# Patient Record
Sex: FEMALE | Race: OTHER | HISPANIC OR LATINO | URBAN - METROPOLITAN AREA
[De-identification: names, ages, dates, MRNs, and addresses within clinical notes are randomized per-mention and may not be internally consistent; named-entity substitution may affect disease eponyms.]

---

## 2018-01-02 ENCOUNTER — EMERGENCY (EMERGENCY)
Facility: HOSPITAL | Age: 28
LOS: 1 days | Discharge: HOME | End: 2018-01-02
Admitting: INTERNAL MEDICINE
Payer: COMMERCIAL

## 2018-01-02 PROCEDURE — 99284 EMERGENCY DEPT VISIT MOD MDM: CPT

## 2018-01-03 PROCEDURE — 99285 EMERGENCY DEPT VISIT HI MDM: CPT | Mod: 25

## 2018-01-03 PROCEDURE — 96375 TX/PRO/DX INJ NEW DRUG ADDON: CPT

## 2018-01-03 PROCEDURE — 94640 AIRWAY INHALATION TREATMENT: CPT

## 2018-01-03 PROCEDURE — 96374 THER/PROPH/DIAG INJ IV PUSH: CPT

## 2018-05-06 ENCOUNTER — EMERGENCY (EMERGENCY)
Facility: HOSPITAL | Age: 28
LOS: 1 days | Discharge: HOME | End: 2018-05-06
Admitting: EMERGENCY MEDICINE
Payer: COMMERCIAL

## 2018-05-06 PROCEDURE — 99283 EMERGENCY DEPT VISIT LOW MDM: CPT

## 2019-01-04 ENCOUNTER — RESULT CHARGE (OUTPATIENT)
Age: 29
End: 2019-01-04

## 2019-01-04 ENCOUNTER — APPOINTMENT (OUTPATIENT)
Dept: FAMILY MEDICINE | Facility: HOSPITAL | Age: 29
End: 2019-01-04

## 2019-01-04 ENCOUNTER — OUTPATIENT (OUTPATIENT)
Dept: OUTPATIENT SERVICES | Facility: HOSPITAL | Age: 29
LOS: 1 days | End: 2019-01-04
Payer: COMMERCIAL

## 2019-01-04 VITALS
BODY MASS INDEX: 42.69 KG/M2 | SYSTOLIC BLOOD PRESSURE: 110 MMHG | TEMPERATURE: 98.2 F | HEIGHT: 62 IN | WEIGHT: 232 LBS | HEART RATE: 84 BPM | OXYGEN SATURATION: 99 % | DIASTOLIC BLOOD PRESSURE: 70 MMHG | RESPIRATION RATE: 16 BRPM

## 2019-01-04 DIAGNOSIS — Z34.90 ENCOUNTER FOR SUPERVISION OF NORMAL PREGNANCY, UNSPECIFIED, UNSPECIFIED TRIMESTER: ICD-10-CM

## 2019-01-04 PROCEDURE — 81002 URINALYSIS NONAUTO W/O SCOPE: CPT

## 2019-01-04 PROCEDURE — G0463: CPT

## 2019-01-04 NOTE — PAST MEDICAL HISTORY
[Total Preg ___] : G: [unfilled] [Live Births___] : P: [unfilled] [Full Term ___] : Full Term: [unfilled]  [Living ___] : Living: [unfilled]

## 2019-01-06 NOTE — ASSESSMENT
[FreeTextEntry1] : 28 yrs   F comes for positive pregnancy test .\par \par #Positive pregnancy test \par -Continue PNV\par -Pyridoxine 25 mg q 6 hrs PRN for the nausea \par -TVUS and TAB with full bladder after 2-3 weeks as she is 4.6 weeks based on LMP\par \par \par \par d/w with Dr Ocampo \par \par RTC in 4 weeks for follow up Initial prenatal

## 2019-01-06 NOTE — HISTORY OF PRESENT ILLNESS
[FreeTextEntry8] : 28 yrs   F 4.6 weeks  comes for positive pregnancy test .Nauseous + no vomiting ,forcing to eat appetite not great because of the nausea and feeling thirsty .Left upper Abdominal pain here and there not bothering her ,No vaginal discharge ,bleeding ,dysuria ,frequency . \par Uncomplicated previous pregnancy .\par Was taking Mucinex and psuedophed took for 2-3 days about 3 weeks ago .\par 1 pregnancy - - (INDUCED)-[facility]\par 2 Pregnancy - - (INDUCED )-[place]\par No travel outside [place] in the past 6 months  \par \par LMP- 2018 was light period

## 2019-01-07 DIAGNOSIS — O21.9 VOMITING OF PREGNANCY, UNSPECIFIED: ICD-10-CM

## 2019-01-07 DIAGNOSIS — Z32.01 ENCOUNTER FOR PREGNANCY TEST, RESULT POSITIVE: ICD-10-CM

## 2019-01-08 LAB
HCG UR QL: POSITIVE
QUALITY CONTROL: YES

## 2019-01-28 ENCOUNTER — FORM ENCOUNTER (OUTPATIENT)
Age: 29
End: 2019-01-28

## 2019-01-29 ENCOUNTER — OUTPATIENT (OUTPATIENT)
Dept: OUTPATIENT SERVICES | Facility: HOSPITAL | Age: 29
LOS: 1 days | End: 2019-01-29
Payer: COMMERCIAL

## 2019-01-29 DIAGNOSIS — Z32.01 ENCOUNTER FOR PREGNANCY TEST, RESULT POSITIVE: ICD-10-CM

## 2019-01-29 PROCEDURE — 76801 OB US < 14 WKS SINGLE FETUS: CPT

## 2019-01-29 PROCEDURE — 76817 TRANSVAGINAL US OBSTETRIC: CPT

## 2019-01-29 PROCEDURE — 76817 TRANSVAGINAL US OBSTETRIC: CPT | Mod: 26

## 2019-01-29 PROCEDURE — 76801 OB US < 14 WKS SINGLE FETUS: CPT | Mod: 26

## 2019-02-08 ENCOUNTER — APPOINTMENT (OUTPATIENT)
Dept: FAMILY MEDICINE | Facility: HOSPITAL | Age: 29
End: 2019-02-08

## 2019-02-08 ENCOUNTER — OUTPATIENT (OUTPATIENT)
Dept: OUTPATIENT SERVICES | Facility: HOSPITAL | Age: 29
LOS: 1 days | End: 2019-02-08
Payer: MEDICAID

## 2019-02-08 VITALS
TEMPERATURE: 97.8 F | SYSTOLIC BLOOD PRESSURE: 94 MMHG | BODY MASS INDEX: 42.51 KG/M2 | HEIGHT: 62 IN | RESPIRATION RATE: 16 BRPM | DIASTOLIC BLOOD PRESSURE: 67 MMHG | HEART RATE: 96 BPM | WEIGHT: 231 LBS | OXYGEN SATURATION: 99 %

## 2019-02-08 DIAGNOSIS — Z00.00 ENCOUNTER FOR GENERAL ADULT MEDICAL EXAMINATION WITHOUT ABNORMAL FINDINGS: ICD-10-CM

## 2019-02-08 PROCEDURE — 81002 URINALYSIS NONAUTO W/O SCOPE: CPT

## 2019-02-08 PROCEDURE — G0463: CPT

## 2019-02-08 RX ORDER — LORATADINE 10 MG
28-0.8 TABLET ORAL
Qty: 60 | Refills: 1 | Status: ACTIVE | COMMUNITY
Start: 2019-02-08

## 2019-02-08 RX ORDER — PYRIDOXINE HCL (VITAMIN B6) 25 MG
25 TABLET ORAL EVERY 6 HOURS
Qty: 30 | Refills: 0 | Status: DISCONTINUED | COMMUNITY
Start: 2019-01-04 | End: 2019-02-08

## 2019-02-08 NOTE — PHYSICAL EXAM
[No Acute Distress] : no acute distress [Well Nourished] : well nourished [No JVD] : no jugular venous distention [No Respiratory Distress] : no respiratory distress  [Clear to Auscultation] : lungs were clear to auscultation bilaterally [No Accessory Muscle Use] : no accessory muscle use [Normal Rate] : normal rate  [Regular Rhythm] : with a regular rhythm [Normal S1, S2] : normal S1 and S2 [No Murmur] : no murmur heard [Pedal Pulses Present] : the pedal pulses are present [No Edema] : there was no peripheral edema [Soft] : abdomen soft [Non Tender] : non-tender [No HSM] : no HSM [Normal Bowel Sounds] : normal bowel sounds [Normal Gait] : normal gait [Normal Affect] : the affect was normal [Normal Insight/Judgement] : insight and judgment were intact

## 2019-02-10 NOTE — HISTORY OF PRESENT ILLNESS
[FreeTextEntry8] : 29 y/o F,  (s/p  x2, both required induction) at 10 weeks+4 days (by US, EDC by US 2019) reports severe headaches. Not responsive to tylenol treatment. \par Has had significant nausea and vomiting for the past several days. 2 episodes vomiting today. Reports she has not been eating significantly but is able to keep down fluids. \par Tried pyridoxine but reports this made her more nauseated. \par \par Taking prenatal vitamins.\par No vaginal bleeding, discharge or fluid leakage.

## 2019-02-10 NOTE — ASSESSMENT
[FreeTextEntry1] : #Nausea and vomiting in pregnancy\par -Trial of zofran\par -Drink plenty of fluids\par -Take tylenol as needed for headaches.\par -RTC 1-2 weeks for follow-up/ initial prenatal. \par -Scheduled ultrascreen. Fingerstick sent. \par

## 2019-02-11 DIAGNOSIS — Z34.90 ENCOUNTER FOR SUPERVISION OF NORMAL PREGNANCY, UNSPECIFIED, UNSPECIFIED TRIMESTER: ICD-10-CM

## 2019-02-11 DIAGNOSIS — O21.9 VOMITING OF PREGNANCY, UNSPECIFIED: ICD-10-CM

## 2019-02-19 ENCOUNTER — APPOINTMENT (OUTPATIENT)
Age: 29
End: 2019-02-19

## 2019-02-28 ENCOUNTER — ASOB RESULT (OUTPATIENT)
Age: 29
End: 2019-02-28

## 2019-02-28 ENCOUNTER — APPOINTMENT (OUTPATIENT)
Dept: ANTEPARTUM | Facility: CLINIC | Age: 29
End: 2019-02-28
Payer: MEDICAID

## 2019-02-28 PROCEDURE — 76801 OB US < 14 WKS SINGLE FETUS: CPT

## 2019-02-28 PROCEDURE — 36416 COLLJ CAPILLARY BLOOD SPEC: CPT

## 2019-02-28 PROCEDURE — 76813 OB US NUCHAL MEAS 1 GEST: CPT

## 2019-03-01 ENCOUNTER — MED ADMIN CHARGE (OUTPATIENT)
Age: 29
End: 2019-03-01

## 2019-03-01 ENCOUNTER — APPOINTMENT (OUTPATIENT)
Age: 29
End: 2019-03-01

## 2019-03-01 ENCOUNTER — OUTPATIENT (OUTPATIENT)
Dept: OUTPATIENT SERVICES | Facility: HOSPITAL | Age: 29
LOS: 1 days | End: 2019-03-01
Payer: MEDICAID

## 2019-03-01 ENCOUNTER — NON-APPOINTMENT (OUTPATIENT)
Age: 29
End: 2019-03-01

## 2019-03-01 VITALS
SYSTOLIC BLOOD PRESSURE: 105 MMHG | OXYGEN SATURATION: 99 % | WEIGHT: 235 LBS | DIASTOLIC BLOOD PRESSURE: 72 MMHG | BODY MASS INDEX: 43.24 KG/M2 | TEMPERATURE: 98.1 F | RESPIRATION RATE: 16 BRPM | HEART RATE: 92 BPM | HEIGHT: 62 IN

## 2019-03-01 DIAGNOSIS — Z34.80 ENCOUNTER FOR SUPERVISION OF OTHER NORMAL PREGNANCY, UNSPECIFIED TRIMESTER: ICD-10-CM

## 2019-03-01 PROCEDURE — 81329 SMN1 GENE DOS/DELETION ALYS: CPT

## 2019-03-01 PROCEDURE — 81220 CFTR GENE COM VARIANTS: CPT

## 2019-03-01 PROCEDURE — 87086 URINE CULTURE/COLONY COUNT: CPT

## 2019-03-01 PROCEDURE — 87389 HIV-1 AG W/HIV-1&-2 AB AG IA: CPT

## 2019-03-01 PROCEDURE — 83655 ASSAY OF LEAD: CPT

## 2019-03-01 PROCEDURE — 80307 DRUG TEST PRSMV CHEM ANLYZR: CPT

## 2019-03-01 PROCEDURE — 86780 TREPONEMA PALLIDUM: CPT

## 2019-03-01 PROCEDURE — 81002 URINALYSIS NONAUTO W/O SCOPE: CPT

## 2019-03-01 PROCEDURE — G0463: CPT

## 2019-03-01 PROCEDURE — 83020 HEMOGLOBIN ELECTROPHORESIS: CPT

## 2019-03-01 PROCEDURE — 86762 RUBELLA ANTIBODY: CPT

## 2019-03-01 PROCEDURE — 86850 RBC ANTIBODY SCREEN: CPT

## 2019-03-01 PROCEDURE — 87800 DETECT AGNT MULT DNA DIREC: CPT

## 2019-03-01 PROCEDURE — 87340 HEPATITIS B SURFACE AG IA: CPT

## 2019-03-01 PROCEDURE — 81001 URINALYSIS AUTO W/SCOPE: CPT

## 2019-03-01 PROCEDURE — 86900 BLOOD TYPING SEROLOGIC ABO: CPT

## 2019-03-03 LAB
APPEARANCE: ABNORMAL
BACTERIA UR CULT: NORMAL
BACTERIA: ABNORMAL
BILIRUBIN URINE: NEGATIVE
BLOOD URINE: NEGATIVE
C TRACH RRNA SPEC QL NAA+PROBE: NOT DETECTED
CALCIUM OXALATE CRYSTALS: ABNORMAL
CANDIDA VAG CYTO: NOT DETECTED
COLOR: YELLOW
G VAGINALIS+PREV SP MTYP VAG QL MICRO: DETECTED
GLUCOSE QUALITATIVE U: NEGATIVE
HBV SURFACE AG SER QL: NONREACTIVE
HIV1+2 AB SPEC QL IA.RAPID: NONREACTIVE
HYALINE CASTS: 5 /LPF
KETONES URINE: NEGATIVE
LEAD BLD-MCNC: 1 UG/DL
LEUKOCYTE ESTERASE URINE: NEGATIVE
MICROSCOPIC-UA: NORMAL
N GONORRHOEA RRNA SPEC QL NAA+PROBE: NOT DETECTED
NITRITE URINE: NEGATIVE
PH URINE: 6.5
PROTEIN URINE: ABNORMAL
RED BLOOD CELLS URINE: 3 /HPF
SOURCE AMPLIFICATION: NORMAL
SPECIFIC GRAVITY URINE: 1.03
SQUAMOUS EPITHELIAL CELLS: >27 /HPF
T PALLIDUM AB SER QL IA: NEGATIVE
T VAGINALIS VAG QL WET PREP: NOT DETECTED
URINE COMMENTS: NORMAL
UROBILINOGEN URINE: NORMAL
WHITE BLOOD CELLS URINE: 23 /HPF

## 2019-03-04 ENCOUNTER — NON-APPOINTMENT (OUTPATIENT)
Age: 29
End: 2019-03-04

## 2019-03-04 LAB
ABO + RH PNL BLD: NORMAL
BLD GP AB SCN SERPL QL: NORMAL
HGB A MFR BLD: 96.8 %
HGB A2 MFR BLD: 2.8 %
HGB F MFR BLD: 0.4 %
HGB FRACT BLD-IMP: NORMAL
RUBV IGG FLD-ACNC: 1 INDEX
RUBV IGG SER-IMP: NORMAL

## 2019-03-07 LAB
AMPHET UR-MCNC: NEGATIVE
BARBITURATES UR-MCNC: NEGATIVE
BENZODIAZ UR-MCNC: NEGATIVE
COCAINE METAB.OTHER UR-MCNC: NEGATIVE
CREATININE, URINE: 152.9 MG/DL
CYTOLOGY CVX/VAG DOC THIN PREP: NORMAL
METHADONE UR-MCNC: NEGATIVE
METHAQUALONE UR-MCNC: NEGATIVE
OPIATES UR-MCNC: NEGATIVE
PCP UR-MCNC: NEGATIVE
PROPOXYPH UR QL: NEGATIVE
THC UR QL: NEGATIVE

## 2019-03-12 LAB
AR GENE MUT ANL BLD/T: NEGATIVE
CFTR MUT TESTED BLD/T: NEGATIVE

## 2019-03-15 ENCOUNTER — OUTPATIENT (OUTPATIENT)
Dept: OUTPATIENT SERVICES | Facility: HOSPITAL | Age: 29
LOS: 1 days | End: 2019-03-15
Payer: MEDICAID

## 2019-03-15 ENCOUNTER — NON-APPOINTMENT (OUTPATIENT)
Age: 29
End: 2019-03-15

## 2019-03-15 ENCOUNTER — APPOINTMENT (OUTPATIENT)
Age: 29
End: 2019-03-15

## 2019-03-15 VITALS
RESPIRATION RATE: 16 BRPM | SYSTOLIC BLOOD PRESSURE: 122 MMHG | TEMPERATURE: 97.6 F | OXYGEN SATURATION: 99 % | HEART RATE: 95 BPM | HEIGHT: 62 IN | BODY MASS INDEX: 43.43 KG/M2 | WEIGHT: 236 LBS | DIASTOLIC BLOOD PRESSURE: 77 MMHG

## 2019-03-15 DIAGNOSIS — Z00.00 ENCOUNTER FOR GENERAL ADULT MEDICAL EXAMINATION WITHOUT ABNORMAL FINDINGS: ICD-10-CM

## 2019-03-15 PROCEDURE — G0463: CPT

## 2019-03-15 PROCEDURE — 81002 URINALYSIS NONAUTO W/O SCOPE: CPT

## 2019-03-19 DIAGNOSIS — Z34.90 ENCOUNTER FOR SUPERVISION OF NORMAL PREGNANCY, UNSPECIFIED, UNSPECIFIED TRIMESTER: ICD-10-CM

## 2019-03-28 ENCOUNTER — NON-APPOINTMENT (OUTPATIENT)
Age: 29
End: 2019-03-28

## 2019-03-28 ENCOUNTER — OUTPATIENT (OUTPATIENT)
Dept: OUTPATIENT SERVICES | Facility: HOSPITAL | Age: 29
LOS: 1 days | End: 2019-03-28
Payer: MEDICAID

## 2019-03-28 ENCOUNTER — APPOINTMENT (OUTPATIENT)
Age: 29
End: 2019-03-28

## 2019-03-28 DIAGNOSIS — Z34.90 ENCOUNTER FOR SUPERVISION OF NORMAL PREGNANCY, UNSPECIFIED, UNSPECIFIED TRIMESTER: ICD-10-CM

## 2019-03-28 PROCEDURE — 81002 URINALYSIS NONAUTO W/O SCOPE: CPT

## 2019-03-28 PROCEDURE — 86336 INHIBIN A: CPT

## 2019-03-28 PROCEDURE — G0463: CPT

## 2019-03-28 RX ORDER — METRONIDAZOLE 500 MG/1
500 TABLET ORAL TWICE DAILY
Qty: 14 | Refills: 0 | Status: COMPLETED | COMMUNITY
Start: 2019-03-03 | End: 2019-03-14

## 2019-03-28 RX ORDER — ONDANSETRON 4 MG/1
4 TABLET ORAL EVERY 8 HOURS
Qty: 1 | Refills: 0 | Status: COMPLETED | COMMUNITY
Start: 2019-02-08 | End: 2019-03-28

## 2019-04-17 ENCOUNTER — ASOB RESULT (OUTPATIENT)
Age: 29
End: 2019-04-17

## 2019-04-17 ENCOUNTER — APPOINTMENT (OUTPATIENT)
Dept: ANTEPARTUM | Facility: CLINIC | Age: 29
End: 2019-04-17
Payer: MEDICAID

## 2019-04-17 PROCEDURE — 76811 OB US DETAILED SNGL FETUS: CPT

## 2019-04-17 PROCEDURE — 76817 TRANSVAGINAL US OBSTETRIC: CPT

## 2019-04-25 ENCOUNTER — NON-APPOINTMENT (OUTPATIENT)
Age: 29
End: 2019-04-25

## 2019-04-25 ENCOUNTER — OUTPATIENT (OUTPATIENT)
Dept: OUTPATIENT SERVICES | Facility: HOSPITAL | Age: 29
LOS: 1 days | End: 2019-04-25
Payer: MEDICAID

## 2019-04-25 ENCOUNTER — APPOINTMENT (OUTPATIENT)
Age: 29
End: 2019-04-25

## 2019-04-25 VITALS
SYSTOLIC BLOOD PRESSURE: 118 MMHG | OXYGEN SATURATION: 100 % | HEART RATE: 86 BPM | WEIGHT: 239 LBS | DIASTOLIC BLOOD PRESSURE: 74 MMHG | BODY MASS INDEX: 43.71 KG/M2 | TEMPERATURE: 97.9 F | RESPIRATION RATE: 16 BRPM

## 2019-04-25 DIAGNOSIS — Z34.80 ENCOUNTER FOR SUPERVISION OF OTHER NORMAL PREGNANCY, UNSPECIFIED TRIMESTER: ICD-10-CM

## 2019-04-25 PROCEDURE — 81002 URINALYSIS NONAUTO W/O SCOPE: CPT

## 2019-04-25 PROCEDURE — G0463: CPT

## 2019-04-30 DIAGNOSIS — Z34.90 ENCOUNTER FOR SUPERVISION OF NORMAL PREGNANCY, UNSPECIFIED, UNSPECIFIED TRIMESTER: ICD-10-CM

## 2019-05-16 ENCOUNTER — OUTPATIENT (OUTPATIENT)
Dept: OUTPATIENT SERVICES | Facility: HOSPITAL | Age: 29
LOS: 1 days | End: 2019-05-16
Payer: MEDICAID

## 2019-05-16 ENCOUNTER — APPOINTMENT (OUTPATIENT)
Age: 29
End: 2019-05-16

## 2019-05-16 ENCOUNTER — NON-APPOINTMENT (OUTPATIENT)
Age: 29
End: 2019-05-16

## 2019-05-16 VITALS
DIASTOLIC BLOOD PRESSURE: 78 MMHG | RESPIRATION RATE: 16 BRPM | OXYGEN SATURATION: 98 % | BODY MASS INDEX: 44.81 KG/M2 | SYSTOLIC BLOOD PRESSURE: 131 MMHG | WEIGHT: 245 LBS | HEART RATE: 96 BPM | TEMPERATURE: 98.2 F

## 2019-05-16 VITALS — SYSTOLIC BLOOD PRESSURE: 139 MMHG | DIASTOLIC BLOOD PRESSURE: 81 MMHG

## 2019-05-16 DIAGNOSIS — Z00.00 ENCOUNTER FOR GENERAL ADULT MEDICAL EXAMINATION WITHOUT ABNORMAL FINDINGS: ICD-10-CM

## 2019-05-16 PROCEDURE — G0463: CPT

## 2019-05-16 PROCEDURE — 81002 URINALYSIS NONAUTO W/O SCOPE: CPT

## 2019-05-17 ENCOUNTER — NON-APPOINTMENT (OUTPATIENT)
Age: 29
End: 2019-05-17

## 2019-05-17 DIAGNOSIS — Z34.90 ENCOUNTER FOR SUPERVISION OF NORMAL PREGNANCY, UNSPECIFIED, UNSPECIFIED TRIMESTER: ICD-10-CM

## 2019-05-20 ENCOUNTER — OUTPATIENT (OUTPATIENT)
Dept: OUTPATIENT SERVICES | Facility: HOSPITAL | Age: 29
LOS: 1 days | End: 2019-05-20
Payer: MEDICAID

## 2019-05-20 ENCOUNTER — APPOINTMENT (OUTPATIENT)
Dept: FAMILY MEDICINE | Facility: HOSPITAL | Age: 29
End: 2019-05-20

## 2019-05-20 VITALS
WEIGHT: 243 LBS | OXYGEN SATURATION: 99 % | HEART RATE: 97 BPM | BODY MASS INDEX: 44.45 KG/M2 | RESPIRATION RATE: 16 BRPM | SYSTOLIC BLOOD PRESSURE: 114 MMHG | TEMPERATURE: 98.3 F | DIASTOLIC BLOOD PRESSURE: 75 MMHG

## 2019-05-20 DIAGNOSIS — O21.9 VOMITING OF PREGNANCY, UNSPECIFIED: ICD-10-CM

## 2019-05-20 DIAGNOSIS — Z32.01 ENCOUNTER FOR PREGNANCY TEST, RESULT POSITIVE: ICD-10-CM

## 2019-05-20 DIAGNOSIS — B96.89 ACUTE VAGINITIS: ICD-10-CM

## 2019-05-20 DIAGNOSIS — N76.0 ACUTE VAGINITIS: ICD-10-CM

## 2019-05-20 DIAGNOSIS — Z00.00 ENCOUNTER FOR GENERAL ADULT MEDICAL EXAMINATION WITHOUT ABNORMAL FINDINGS: ICD-10-CM

## 2019-05-20 PROCEDURE — G0463: CPT

## 2019-05-20 PROCEDURE — 81002 URINALYSIS NONAUTO W/O SCOPE: CPT

## 2019-05-20 NOTE — PHYSICAL EXAM
[No Acute Distress] : no acute distress [Well Nourished] : well nourished [Well Developed] : well developed [Well-Appearing] : well-appearing [PERRL] : pupils equal round and reactive to light [Normal Sclera/Conjunctiva] : normal sclera/conjunctiva [EOMI] : extraocular movements intact [Normal Oropharynx] : the oropharynx was normal [Normal Outer Ear/Nose] : the outer ears and nose were normal in appearance [No JVD] : no jugular venous distention [Thyroid Normal, No Nodules] : the thyroid was normal and there were no nodules present [No Lymphadenopathy] : no lymphadenopathy [Supple] : supple [No Respiratory Distress] : no respiratory distress  [Clear to Auscultation] : lungs were clear to auscultation bilaterally [No Accessory Muscle Use] : no accessory muscle use [Normal Rate] : normal rate  [Normal S1, S2] : normal S1 and S2 [Regular Rhythm] : with a regular rhythm [No Murmur] : no murmur heard [No Carotid Bruits] : no carotid bruits [No Abdominal Bruit] : a ~M bruit was not heard ~T in the abdomen [No Varicosities] : no varicosities [No Edema] : there was no peripheral edema [Pedal Pulses Present] : the pedal pulses are present [Soft] : abdomen soft [No Extremity Clubbing/Cyanosis] : no extremity clubbing/cyanosis [No Palpable Aorta] : no palpable aorta [Non-distended] : non-distended [Non Tender] : non-tender [No Masses] : no abdominal mass palpated [No HSM] : no HSM [Normal Posterior Cervical Nodes] : no posterior cervical lymphadenopathy [Normal Bowel Sounds] : normal bowel sounds [No CVA Tenderness] : no CVA  tenderness [Normal Anterior Cervical Nodes] : no anterior cervical lymphadenopathy [No Joint Swelling] : no joint swelling [No Spinal Tenderness] : no spinal tenderness [No Rash] : no rash [Grossly Normal Strength/Tone] : grossly normal strength/tone [Normal Gait] : normal gait [Coordination Grossly Intact] : coordination grossly intact [No Focal Deficits] : no focal deficits [Deep Tendon Reflexes (DTR)] : deep tendon reflexes were 2+ and symmetric [Normal Affect] : the affect was normal [Normal Insight/Judgement] : insight and judgment were intact [de-identified] : morbidly obese

## 2019-05-20 NOTE — ASSESSMENT
[FreeTextEntry1] : #Elevated BP\par - RESOLVED\par - 114/75 today\par - Stay well hydrated\par - Cont PNV\par \par RTC on 5/31 9AM for PN follow up

## 2019-05-20 NOTE — HISTORY OF PRESENT ILLNESS
[FreeTextEntry8] : 27 YO  @25w GA presenting for BP check.  Was elevated at 139/81 on last visit. today normal. Patient feels tired sometimes.  No abdominal pain, baby moving, no blood or vaginal discharge. Staying well hydrated, taking PNV.

## 2019-05-21 DIAGNOSIS — Z34.90 ENCOUNTER FOR SUPERVISION OF NORMAL PREGNANCY, UNSPECIFIED, UNSPECIFIED TRIMESTER: ICD-10-CM

## 2019-05-31 ENCOUNTER — APPOINTMENT (OUTPATIENT)
Dept: FAMILY MEDICINE | Facility: HOSPITAL | Age: 29
End: 2019-05-31

## 2019-05-31 ENCOUNTER — NON-APPOINTMENT (OUTPATIENT)
Age: 29
End: 2019-05-31

## 2019-05-31 ENCOUNTER — OUTPATIENT (OUTPATIENT)
Dept: OUTPATIENT SERVICES | Facility: HOSPITAL | Age: 29
LOS: 1 days | End: 2019-05-31
Payer: MEDICAID

## 2019-05-31 VITALS
HEIGHT: 62 IN | OXYGEN SATURATION: 99 % | BODY MASS INDEX: 45.08 KG/M2 | DIASTOLIC BLOOD PRESSURE: 73 MMHG | WEIGHT: 245 LBS | TEMPERATURE: 97.8 F | SYSTOLIC BLOOD PRESSURE: 104 MMHG | RESPIRATION RATE: 16 BRPM | HEART RATE: 96 BPM

## 2019-05-31 DIAGNOSIS — Z34.01 ENCOUNTER FOR SUPERVISION OF NORMAL FIRST PREGNANCY, FIRST TRIMESTER: ICD-10-CM

## 2019-05-31 PROCEDURE — 82950 GLUCOSE TEST: CPT

## 2019-05-31 PROCEDURE — G0463: CPT

## 2019-05-31 PROCEDURE — 87389 HIV-1 AG W/HIV-1&-2 AB AG IA: CPT

## 2019-05-31 PROCEDURE — 86765 RUBEOLA ANTIBODY: CPT

## 2019-05-31 PROCEDURE — 81002 URINALYSIS NONAUTO W/O SCOPE: CPT

## 2019-06-04 DIAGNOSIS — Z34.90 ENCOUNTER FOR SUPERVISION OF NORMAL PREGNANCY, UNSPECIFIED, UNSPECIFIED TRIMESTER: ICD-10-CM

## 2019-06-14 ENCOUNTER — OUTPATIENT (OUTPATIENT)
Dept: OUTPATIENT SERVICES | Facility: HOSPITAL | Age: 29
LOS: 1 days | End: 2019-06-14
Payer: MEDICAID

## 2019-06-14 ENCOUNTER — APPOINTMENT (OUTPATIENT)
Dept: FAMILY MEDICINE | Facility: HOSPITAL | Age: 29
End: 2019-06-14

## 2019-06-14 ENCOUNTER — NON-APPOINTMENT (OUTPATIENT)
Age: 29
End: 2019-06-14

## 2019-06-14 VITALS
RESPIRATION RATE: 16 BRPM | SYSTOLIC BLOOD PRESSURE: 98 MMHG | HEART RATE: 97 BPM | OXYGEN SATURATION: 99 % | HEIGHT: 62 IN | BODY MASS INDEX: 45.45 KG/M2 | WEIGHT: 247 LBS | DIASTOLIC BLOOD PRESSURE: 64 MMHG | TEMPERATURE: 97.7 F

## 2019-06-14 DIAGNOSIS — Z00.00 ENCOUNTER FOR GENERAL ADULT MEDICAL EXAMINATION WITHOUT ABNORMAL FINDINGS: ICD-10-CM

## 2019-06-14 LAB — BLD GP AB SCN SERPL QL: SIGNIFICANT CHANGE UP

## 2019-06-14 PROCEDURE — 86850 RBC ANTIBODY SCREEN: CPT

## 2019-06-14 PROCEDURE — 86900 BLOOD TYPING SEROLOGIC ABO: CPT

## 2019-06-14 PROCEDURE — 81002 URINALYSIS NONAUTO W/O SCOPE: CPT

## 2019-06-14 PROCEDURE — 86901 BLOOD TYPING SEROLOGIC RH(D): CPT

## 2019-06-14 PROCEDURE — G0463: CPT

## 2019-06-14 PROCEDURE — 82731 ASSAY OF FETAL FIBRONECTIN: CPT

## 2019-06-14 RX ORDER — HUMAN RHO(D) IMMUNE GLOBULIN 300 UG/1
1500 INJECTION, SOLUTION INTRAMUSCULAR
Qty: 1 | Refills: 0 | Status: DISCONTINUED | COMMUNITY
Start: 2019-06-14 | End: 2019-06-14

## 2019-06-14 RX ADMIN — HUMAN RHO(D) IMMUNE GLOBULIN 0 UNIT: 300 INJECTION, SOLUTION INTRAMUSCULAR at 00:00

## 2019-06-17 DIAGNOSIS — Z34.90 ENCOUNTER FOR SUPERVISION OF NORMAL PREGNANCY, UNSPECIFIED, UNSPECIFIED TRIMESTER: ICD-10-CM

## 2019-06-18 ENCOUNTER — NON-APPOINTMENT (OUTPATIENT)
Age: 29
End: 2019-06-18

## 2019-06-18 DIAGNOSIS — O26.892 OTHER SPECIFIED PREGNANCY RELATED CONDITIONS, SECOND TRIMESTER: ICD-10-CM

## 2019-06-18 DIAGNOSIS — Z67.91 OTHER SPECIFIED PREGNANCY RELATED CONDITIONS, SECOND TRIMESTER: ICD-10-CM

## 2019-06-20 ENCOUNTER — APPOINTMENT (OUTPATIENT)
Dept: FAMILY MEDICINE | Facility: HOSPITAL | Age: 29
End: 2019-06-20

## 2019-06-20 ENCOUNTER — OUTPATIENT (OUTPATIENT)
Dept: OUTPATIENT SERVICES | Facility: HOSPITAL | Age: 29
LOS: 1 days | End: 2019-06-20
Payer: MEDICAID

## 2019-06-20 DIAGNOSIS — Z34.90 ENCOUNTER FOR SUPERVISION OF NORMAL PREGNANCY, UNSPECIFIED, UNSPECIFIED TRIMESTER: ICD-10-CM

## 2019-06-20 LAB
BLD GP AB SCN SERPL QL: SIGNIFICANT CHANGE UP
GLUCOSE 1H P GLC SERPL-MCNC: 205 MG/DL — HIGH (ref 70–199)
GLUCOSE 2H P GLC SERPL-MCNC: 150 MG/DL — SIGNIFICANT CHANGE UP
GLUCOSE 3H P GLC SERPL-MCNC: 56 MG/DL — SIGNIFICANT CHANGE UP
GLUCOSE P FAST BLDV-MCNC: 104 MG/DL — HIGH (ref 70–99)

## 2019-06-20 PROCEDURE — 86901 BLOOD TYPING SEROLOGIC RH(D): CPT

## 2019-06-20 PROCEDURE — 82951 GLUCOSE TOLERANCE TEST (GTT): CPT

## 2019-06-20 PROCEDURE — 86850 RBC ANTIBODY SCREEN: CPT

## 2019-06-20 PROCEDURE — 86900 BLOOD TYPING SEROLOGIC ABO: CPT

## 2019-06-26 DIAGNOSIS — O24.419 GESTATIONAL DIABETES MELLITUS IN PREGNANCY, UNSPECIFIED CONTROL: ICD-10-CM

## 2019-06-26 DIAGNOSIS — Z86.32 PERSONAL HISTORY OF GESTATIONAL DIABETES: ICD-10-CM

## 2019-06-28 ENCOUNTER — MESSAGE (OUTPATIENT)
Age: 29
End: 2019-06-28

## 2019-06-28 ENCOUNTER — APPOINTMENT (OUTPATIENT)
Dept: FAMILY MEDICINE | Facility: HOSPITAL | Age: 29
End: 2019-06-28

## 2019-06-28 ENCOUNTER — OUTPATIENT (OUTPATIENT)
Dept: OUTPATIENT SERVICES | Facility: HOSPITAL | Age: 29
LOS: 1 days | End: 2019-06-28
Payer: MEDICAID

## 2019-06-28 VITALS
SYSTOLIC BLOOD PRESSURE: 90 MMHG | OXYGEN SATURATION: 98 % | RESPIRATION RATE: 16 BRPM | TEMPERATURE: 97.6 F | HEART RATE: 85 BPM | DIASTOLIC BLOOD PRESSURE: 61 MMHG | BODY MASS INDEX: 46.19 KG/M2 | HEIGHT: 62 IN | WEIGHT: 251 LBS

## 2019-06-28 DIAGNOSIS — Z00.00 ENCOUNTER FOR GENERAL ADULT MEDICAL EXAMINATION WITHOUT ABNORMAL FINDINGS: ICD-10-CM

## 2019-06-28 PROCEDURE — 81002 URINALYSIS NONAUTO W/O SCOPE: CPT

## 2019-06-28 PROCEDURE — G0463: CPT

## 2019-06-29 ENCOUNTER — NON-APPOINTMENT (OUTPATIENT)
Age: 29
End: 2019-06-29

## 2019-07-02 ENCOUNTER — APPOINTMENT (OUTPATIENT)
Dept: MATERNAL FETAL MEDICINE | Facility: CLINIC | Age: 29
End: 2019-07-02
Payer: MEDICAID

## 2019-07-02 ENCOUNTER — ASOB RESULT (OUTPATIENT)
Age: 29
End: 2019-07-02

## 2019-07-02 DIAGNOSIS — Z34.90 ENCOUNTER FOR SUPERVISION OF NORMAL PREGNANCY, UNSPECIFIED, UNSPECIFIED TRIMESTER: ICD-10-CM

## 2019-07-02 PROCEDURE — G0109 DIAB MANAGE TRN IND/GROUP: CPT

## 2019-07-03 RX ORDER — URINE ACETONE TEST STRIPS
STRIP MISCELLANEOUS
Qty: 1 | Refills: 1 | Status: ACTIVE | COMMUNITY
Start: 2019-07-02 | End: 1900-01-01

## 2019-07-03 RX ORDER — LANCETS 33 GAUGE
EACH MISCELLANEOUS
Qty: 1 | Refills: 2 | Status: ACTIVE | COMMUNITY
Start: 2019-07-02 | End: 1900-01-01

## 2019-07-03 RX ORDER — BLOOD-GLUCOSE METER
KIT MISCELLANEOUS
Qty: 1 | Refills: 5 | Status: ACTIVE | COMMUNITY
Start: 2019-07-02 | End: 1900-01-01

## 2019-07-04 ENCOUNTER — NON-APPOINTMENT (OUTPATIENT)
Age: 29
End: 2019-07-04

## 2019-07-16 ENCOUNTER — APPOINTMENT (OUTPATIENT)
Dept: MATERNAL FETAL MEDICINE | Facility: CLINIC | Age: 29
End: 2019-07-16
Payer: MEDICAID

## 2019-07-16 LAB
GLUCOSE 1H P 100 G GLC PO SERPL-MCNC: 163 MG/DL
HIV1+2 AB SPEC QL IA.RAPID: NONREACTIVE
MEV IGG FLD QL IA: 33.7 AU/ML
MEV IGG+IGM SER-IMP: POSITIVE

## 2019-07-16 PROCEDURE — G0109 DIAB MANAGE TRN IND/GROUP: CPT

## 2019-07-17 ENCOUNTER — OUTPATIENT (OUTPATIENT)
Dept: OUTPATIENT SERVICES | Facility: HOSPITAL | Age: 29
LOS: 1 days | End: 2019-07-17
Payer: MEDICAID

## 2019-07-17 ENCOUNTER — APPOINTMENT (OUTPATIENT)
Dept: FAMILY MEDICINE | Facility: HOSPITAL | Age: 29
End: 2019-07-17

## 2019-07-17 ENCOUNTER — APPOINTMENT (OUTPATIENT)
Dept: MATERNAL FETAL MEDICINE | Facility: CLINIC | Age: 29
End: 2019-07-17

## 2019-07-17 ENCOUNTER — NON-APPOINTMENT (OUTPATIENT)
Age: 29
End: 2019-07-17

## 2019-07-17 VITALS
RESPIRATION RATE: 16 BRPM | SYSTOLIC BLOOD PRESSURE: 94 MMHG | OXYGEN SATURATION: 98 % | WEIGHT: 249 LBS | TEMPERATURE: 98.1 F | DIASTOLIC BLOOD PRESSURE: 70 MMHG | HEART RATE: 89 BPM | BODY MASS INDEX: 45.54 KG/M2

## 2019-07-17 DIAGNOSIS — Z34.80 ENCOUNTER FOR SUPERVISION OF OTHER NORMAL PREGNANCY, UNSPECIFIED TRIMESTER: ICD-10-CM

## 2019-07-17 LAB
1ST TRIMESTER DATA: NORMAL
2ND TRIMESTER DATA: NORMAL
AFP PNL SERPL: NORMAL
AFP SERPL-ACNC: NORMAL
AFP SERPL-ACNC: NORMAL
B-HCG FREE SERPL-MCNC: NORMAL
BASOPHILS # BLD AUTO: 0.02 K/UL
BASOPHILS NFR BLD AUTO: 0.2 %
CLINICAL BIOCHEMIST REVIEW: NORMAL
EOSINOPHIL # BLD AUTO: 0.09 K/UL
EOSINOPHIL NFR BLD AUTO: 1 %
FIBRONECTIN PLAS-MCNC: NEGATIVE
FREE BETA HCG 1ST TRIMESTER: NORMAL
HCT VFR BLD CALC: 39.2 %
HGB BLD-MCNC: 12.9 G/DL
IMM GRANULOCYTES NFR BLD AUTO: 0.3 %
INHIBIN A SERPL-MCNC: NORMAL
LYMPHOCYTES # BLD AUTO: 2.29 K/UL
LYMPHOCYTES NFR BLD AUTO: 24.4 %
MAN DIFF?: NORMAL
MCHC RBC-ENTMCNC: 30 PG
MCHC RBC-ENTMCNC: 32.9 GM/DL
MCV RBC AUTO: 91.2 FL
MONOCYTES # BLD AUTO: 0.59 K/UL
MONOCYTES NFR BLD AUTO: 6.3 %
NEUTROPHILS # BLD AUTO: 6.35 K/UL
NEUTROPHILS NFR BLD AUTO: 67.8 %
NOTES NTD: NORMAL
NT: NORMAL
PAPP-A SERPL-ACNC: NORMAL
PLATELET # BLD AUTO: 346 K/UL
RBC # BLD: 4.3 M/UL
RBC # FLD: 13.3 %
U ESTRIOL SERPL-SCNC: NORMAL
WBC # FLD AUTO: 9.37 K/UL

## 2019-07-17 PROCEDURE — G0463: CPT

## 2019-07-17 PROCEDURE — 81002 URINALYSIS NONAUTO W/O SCOPE: CPT

## 2019-07-18 ENCOUNTER — NON-APPOINTMENT (OUTPATIENT)
Age: 29
End: 2019-07-18

## 2019-07-18 DIAGNOSIS — Z34.90 ENCOUNTER FOR SUPERVISION OF NORMAL PREGNANCY, UNSPECIFIED, UNSPECIFIED TRIMESTER: ICD-10-CM

## 2019-07-22 ENCOUNTER — OUTPATIENT (OUTPATIENT)
Dept: OUTPATIENT SERVICES | Facility: HOSPITAL | Age: 29
LOS: 1 days | End: 2019-07-22

## 2019-07-22 DIAGNOSIS — Z34.90 ENCOUNTER FOR SUPERVISION OF NORMAL PREGNANCY, UNSPECIFIED, UNSPECIFIED TRIMESTER: ICD-10-CM

## 2019-07-23 ENCOUNTER — APPOINTMENT (OUTPATIENT)
Dept: MATERNAL FETAL MEDICINE | Facility: CLINIC | Age: 29
End: 2019-07-23
Payer: MEDICAID

## 2019-07-23 ENCOUNTER — APPOINTMENT (OUTPATIENT)
Dept: ANTEPARTUM | Facility: CLINIC | Age: 29
End: 2019-07-23
Payer: MEDICAID

## 2019-07-23 ENCOUNTER — ASOB RESULT (OUTPATIENT)
Age: 29
End: 2019-07-23

## 2019-07-23 PROCEDURE — 76816 OB US FOLLOW-UP PER FETUS: CPT

## 2019-07-23 PROCEDURE — G0108 DIAB MANAGE TRN  PER INDIV: CPT

## 2019-07-28 LAB
BASOPHILS # BLD AUTO: 0.02 K/UL
BASOPHILS NFR BLD AUTO: 0.3 %
EOSINOPHIL # BLD AUTO: 0.07 K/UL
EOSINOPHIL NFR BLD AUTO: 0.9 %
HCT VFR BLD CALC: 40.8 %
HGB BLD-MCNC: 12.7 G/DL
IMM GRANULOCYTES NFR BLD AUTO: 0.4 %
LYMPHOCYTES # BLD AUTO: 1.47 K/UL
LYMPHOCYTES NFR BLD AUTO: 19.4 %
MAN DIFF?: NORMAL
MCHC RBC-ENTMCNC: 27 PG
MCHC RBC-ENTMCNC: 31.1 GM/DL
MCV RBC AUTO: 86.8 FL
MONOCYTES # BLD AUTO: 0.59 K/UL
MONOCYTES NFR BLD AUTO: 7.8 %
NEUTROPHILS # BLD AUTO: 5.39 K/UL
NEUTROPHILS NFR BLD AUTO: 71.2 %
PLATELET # BLD AUTO: 299 K/UL
RBC # BLD: 4.7 M/UL
RBC # FLD: 14.7 %
WBC # FLD AUTO: 7.57 K/UL

## 2019-07-30 ENCOUNTER — APPOINTMENT (OUTPATIENT)
Dept: MATERNAL FETAL MEDICINE | Facility: CLINIC | Age: 29
End: 2019-07-30

## 2019-07-30 ENCOUNTER — MESSAGE (OUTPATIENT)
Age: 29
End: 2019-07-30

## 2019-08-01 ENCOUNTER — OUTPATIENT (OUTPATIENT)
Dept: OUTPATIENT SERVICES | Facility: HOSPITAL | Age: 29
LOS: 1 days | End: 2019-08-01
Payer: MEDICAID

## 2019-08-01 ENCOUNTER — APPOINTMENT (OUTPATIENT)
Dept: FAMILY MEDICINE | Facility: HOSPITAL | Age: 29
End: 2019-08-01

## 2019-08-01 ENCOUNTER — OUTPATIENT (OUTPATIENT)
Dept: INPATIENT UNIT | Facility: HOSPITAL | Age: 29
LOS: 1 days | Discharge: HOME | End: 2019-08-01

## 2019-08-01 VITALS
DIASTOLIC BLOOD PRESSURE: 73 MMHG | RESPIRATION RATE: 16 BRPM | WEIGHT: 250 LBS | TEMPERATURE: 97.6 F | SYSTOLIC BLOOD PRESSURE: 107 MMHG | HEART RATE: 91 BPM | OXYGEN SATURATION: 100 % | BODY MASS INDEX: 45.73 KG/M2

## 2019-08-01 DIAGNOSIS — Z34.80 ENCOUNTER FOR SUPERVISION OF OTHER NORMAL PREGNANCY, UNSPECIFIED TRIMESTER: ICD-10-CM

## 2019-08-01 PROCEDURE — G0463: CPT

## 2019-08-01 PROCEDURE — 81002 URINALYSIS NONAUTO W/O SCOPE: CPT

## 2019-08-02 ENCOUNTER — NON-APPOINTMENT (OUTPATIENT)
Age: 29
End: 2019-08-02

## 2019-08-02 DIAGNOSIS — Z34.90 ENCOUNTER FOR SUPERVISION OF NORMAL PREGNANCY, UNSPECIFIED, UNSPECIFIED TRIMESTER: ICD-10-CM

## 2019-08-06 ENCOUNTER — APPOINTMENT (OUTPATIENT)
Dept: ANTEPARTUM | Facility: CLINIC | Age: 29
End: 2019-08-06
Payer: MEDICAID

## 2019-08-06 ENCOUNTER — ASOB RESULT (OUTPATIENT)
Age: 29
End: 2019-08-06

## 2019-08-06 PROCEDURE — 76818 FETAL BIOPHYS PROFILE W/NST: CPT

## 2019-08-06 RX ORDER — BLOOD-GLUCOSE METER
W/DEVICE KIT MISCELLANEOUS
Qty: 1 | Refills: 0 | Status: ACTIVE | COMMUNITY
Start: 2019-07-02 | End: 1900-01-01

## 2019-08-08 ENCOUNTER — OUTPATIENT (OUTPATIENT)
Dept: OUTPATIENT SERVICES | Facility: HOSPITAL | Age: 29
LOS: 1 days | End: 2019-08-08
Payer: MEDICAID

## 2019-08-08 ENCOUNTER — APPOINTMENT (OUTPATIENT)
Dept: FAMILY MEDICINE | Facility: HOSPITAL | Age: 29
End: 2019-08-08

## 2019-08-08 VITALS
TEMPERATURE: 97.7 F | OXYGEN SATURATION: 99 % | RESPIRATION RATE: 16 BRPM | WEIGHT: 248 LBS | BODY MASS INDEX: 45.36 KG/M2 | SYSTOLIC BLOOD PRESSURE: 120 MMHG | HEART RATE: 98 BPM | DIASTOLIC BLOOD PRESSURE: 70 MMHG

## 2019-08-08 DIAGNOSIS — Z00.00 ENCOUNTER FOR GENERAL ADULT MEDICAL EXAMINATION WITHOUT ABNORMAL FINDINGS: ICD-10-CM

## 2019-08-08 PROCEDURE — G0463: CPT

## 2019-08-08 PROCEDURE — 81002 URINALYSIS NONAUTO W/O SCOPE: CPT

## 2019-08-08 PROCEDURE — 87653 STREP B DNA AMP PROBE: CPT

## 2019-08-09 DIAGNOSIS — Z34.90 ENCOUNTER FOR SUPERVISION OF NORMAL PREGNANCY, UNSPECIFIED, UNSPECIFIED TRIMESTER: ICD-10-CM

## 2019-08-10 ENCOUNTER — NON-APPOINTMENT (OUTPATIENT)
Age: 29
End: 2019-08-10

## 2019-08-13 ENCOUNTER — APPOINTMENT (OUTPATIENT)
Dept: ANTEPARTUM | Facility: CLINIC | Age: 29
End: 2019-08-13
Payer: MEDICAID

## 2019-08-13 ENCOUNTER — ASOB RESULT (OUTPATIENT)
Age: 29
End: 2019-08-13

## 2019-08-13 ENCOUNTER — NON-APPOINTMENT (OUTPATIENT)
Age: 29
End: 2019-08-13

## 2019-08-13 ENCOUNTER — APPOINTMENT (OUTPATIENT)
Dept: MATERNAL FETAL MEDICINE | Facility: CLINIC | Age: 29
End: 2019-08-13
Payer: MEDICAID

## 2019-08-13 PROCEDURE — 76818 FETAL BIOPHYS PROFILE W/NST: CPT

## 2019-08-13 PROCEDURE — G0108 DIAB MANAGE TRN  PER INDIV: CPT

## 2019-08-15 ENCOUNTER — NON-APPOINTMENT (OUTPATIENT)
Age: 29
End: 2019-08-15

## 2019-08-15 ENCOUNTER — OUTPATIENT (OUTPATIENT)
Dept: OUTPATIENT SERVICES | Facility: HOSPITAL | Age: 29
LOS: 1 days | End: 2019-08-15
Payer: MEDICAID

## 2019-08-15 ENCOUNTER — APPOINTMENT (OUTPATIENT)
Dept: FAMILY MEDICINE | Facility: HOSPITAL | Age: 29
End: 2019-08-15

## 2019-08-15 VITALS
OXYGEN SATURATION: 98 % | BODY MASS INDEX: 45.54 KG/M2 | RESPIRATION RATE: 16 BRPM | TEMPERATURE: 97.7 F | HEART RATE: 91 BPM | WEIGHT: 249 LBS | SYSTOLIC BLOOD PRESSURE: 136 MMHG | DIASTOLIC BLOOD PRESSURE: 63 MMHG

## 2019-08-15 DIAGNOSIS — Z34.80 ENCOUNTER FOR SUPERVISION OF OTHER NORMAL PREGNANCY, UNSPECIFIED TRIMESTER: ICD-10-CM

## 2019-08-15 DIAGNOSIS — N89.8 OTHER SPECIFIED NONINFLAMMATORY DISORDERS OF VAGINA: ICD-10-CM

## 2019-08-15 PROCEDURE — 81002 URINALYSIS NONAUTO W/O SCOPE: CPT

## 2019-08-15 PROCEDURE — G0463: CPT

## 2019-08-15 PROCEDURE — 87070 CULTURE OTHR SPECIMN AEROBIC: CPT

## 2019-08-15 RX ORDER — METFORMIN ER 500 MG 500 MG/1
500 TABLET ORAL DAILY
Refills: 0 | Status: ACTIVE | COMMUNITY
Start: 2019-07-23

## 2019-08-16 ENCOUNTER — APPOINTMENT (OUTPATIENT)
Dept: MATERNAL FETAL MEDICINE | Facility: CLINIC | Age: 29
End: 2019-08-16

## 2019-08-16 DIAGNOSIS — Z34.90 ENCOUNTER FOR SUPERVISION OF NORMAL PREGNANCY, UNSPECIFIED, UNSPECIFIED TRIMESTER: ICD-10-CM

## 2019-08-18 LAB — BACTERIA GENITAL AEROBE CULT: NORMAL

## 2019-08-19 ENCOUNTER — OUTPATIENT (OUTPATIENT)
Dept: INPATIENT UNIT | Facility: HOSPITAL | Age: 29
LOS: 1 days | Discharge: HOME | End: 2019-08-19
Payer: MEDICAID

## 2019-08-19 DIAGNOSIS — Z3A.00 WEEKS OF GESTATION OF PREGNANCY NOT SPECIFIED: ICD-10-CM

## 2019-08-19 PROCEDURE — G0463: CPT

## 2019-08-19 PROCEDURE — 59025 FETAL NON-STRESS TEST: CPT

## 2019-08-20 ENCOUNTER — APPOINTMENT (OUTPATIENT)
Dept: MATERNAL FETAL MEDICINE | Facility: CLINIC | Age: 29
End: 2019-08-20
Payer: MEDICAID

## 2019-08-20 ENCOUNTER — APPOINTMENT (OUTPATIENT)
Dept: ANTEPARTUM | Facility: CLINIC | Age: 29
End: 2019-08-20
Payer: MEDICAID

## 2019-08-20 ENCOUNTER — ASOB RESULT (OUTPATIENT)
Age: 29
End: 2019-08-20

## 2019-08-20 ENCOUNTER — OUTPATIENT (OUTPATIENT)
Dept: OUTPATIENT SERVICES | Facility: HOSPITAL | Age: 29
LOS: 1 days | End: 2019-08-20
Payer: MEDICAID

## 2019-08-20 DIAGNOSIS — Z3A.00 WEEKS OF GESTATION OF PREGNANCY NOT SPECIFIED: ICD-10-CM

## 2019-08-20 LAB
BASOPHILS # BLD AUTO: 0.02 K/UL
BASOPHILS NFR BLD AUTO: 0.3 %
C TRACH RRNA SPEC QL NAA+PROBE: NOT DETECTED
EOSINOPHIL # BLD AUTO: 0.04 K/UL
EOSINOPHIL NFR BLD AUTO: 0.5 %
GP B STREP DNA SPEC QL NAA+PROBE: NORMAL
GP B STREP DNA SPEC QL NAA+PROBE: NOT DETECTED
HCT VFR BLD CALC: 40.6 %
HGB BLD-MCNC: 12.6 G/DL
IMM GRANULOCYTES NFR BLD AUTO: 0.4 %
LYMPHOCYTES # BLD AUTO: 1.43 K/UL
LYMPHOCYTES NFR BLD AUTO: 18.9 %
MAN DIFF?: NORMAL
MCHC RBC-ENTMCNC: 27.2 PG
MCHC RBC-ENTMCNC: 31 GM/DL
MCV RBC AUTO: 87.5 FL
MONOCYTES # BLD AUTO: 0.56 K/UL
MONOCYTES NFR BLD AUTO: 7.4 %
N GONORRHOEA RRNA SPEC QL NAA+PROBE: NOT DETECTED
NEUTROPHILS # BLD AUTO: 5.47 K/UL
NEUTROPHILS NFR BLD AUTO: 72.5 %
PLATELET # BLD AUTO: 273 K/UL
RBC # BLD: 4.64 M/UL
RBC # FLD: 15.7 %
SOURCE AMPLIFICATION: NORMAL
SOURCE GBS: NORMAL
WBC # FLD AUTO: 7.55 K/UL

## 2019-08-20 PROCEDURE — 76818 FETAL BIOPHYS PROFILE W/NST: CPT | Mod: 26

## 2019-08-20 PROCEDURE — 76816 OB US FOLLOW-UP PER FETUS: CPT

## 2019-08-20 PROCEDURE — 76816 OB US FOLLOW-UP PER FETUS: CPT | Mod: 26

## 2019-08-20 PROCEDURE — 76818 FETAL BIOPHYS PROFILE W/NST: CPT

## 2019-08-20 PROCEDURE — G0108 DIAB MANAGE TRN  PER INDIV: CPT

## 2019-08-21 DIAGNOSIS — O36.8130 DECREASED FETAL MOVEMENTS, THIRD TRIMESTER, NOT APPLICABLE OR UNSPECIFIED: ICD-10-CM

## 2019-08-21 DIAGNOSIS — O24.419 GESTATIONAL DIABETES MELLITUS IN PREGNANCY, UNSPECIFIED CONTROL: ICD-10-CM

## 2019-08-21 DIAGNOSIS — Z3A.37 37 WEEKS GESTATION OF PREGNANCY: ICD-10-CM

## 2019-08-22 ENCOUNTER — NON-APPOINTMENT (OUTPATIENT)
Age: 29
End: 2019-08-22

## 2019-08-23 ENCOUNTER — LABORATORY RESULT (OUTPATIENT)
Age: 29
End: 2019-08-23

## 2019-08-23 ENCOUNTER — OUTPATIENT (OUTPATIENT)
Dept: OUTPATIENT SERVICES | Facility: HOSPITAL | Age: 29
LOS: 1 days | End: 2019-08-23
Payer: MEDICAID

## 2019-08-23 ENCOUNTER — APPOINTMENT (OUTPATIENT)
Dept: MATERNAL FETAL MEDICINE | Facility: CLINIC | Age: 29
End: 2019-08-23
Payer: MEDICAID

## 2019-08-23 VITALS — DIASTOLIC BLOOD PRESSURE: 76 MMHG | BODY MASS INDEX: 44.99 KG/M2 | WEIGHT: 246 LBS | SYSTOLIC BLOOD PRESSURE: 110 MMHG

## 2019-08-23 DIAGNOSIS — Z86.32 PERSONAL HISTORY OF GESTATIONAL DIABETES: ICD-10-CM

## 2019-08-23 DIAGNOSIS — O09.90 SUPERVISION OF HIGH RISK PREGNANCY, UNSPECIFIED, UNSPECIFIED TRIMESTER: ICD-10-CM

## 2019-08-23 LAB
BILIRUB UR QL STRIP: NORMAL
GLUCOSE UR-MCNC: NORMAL
HCG UR QL: 1 EU/DL
HGB UR QL STRIP.AUTO: NORMAL
KETONES UR-MCNC: NORMAL
LEUKOCYTE ESTERASE UR QL STRIP: NORMAL
NITRITE UR QL STRIP: NORMAL
PH UR STRIP: 6.5
PROT UR STRIP-MCNC: NORMAL
SP GR UR STRIP: 1.02

## 2019-08-23 PROCEDURE — 99203 OFFICE O/P NEW LOW 30 MIN: CPT | Mod: GE,25

## 2019-08-23 PROCEDURE — 99213 OFFICE O/P EST LOW 20 MIN: CPT

## 2019-08-23 PROCEDURE — G0463: CPT | Mod: 25

## 2019-08-23 PROCEDURE — 81003 URINALYSIS AUTO W/O SCOPE: CPT

## 2019-08-27 ENCOUNTER — APPOINTMENT (OUTPATIENT)
Dept: ANTEPARTUM | Facility: CLINIC | Age: 29
End: 2019-08-27

## 2019-08-27 ENCOUNTER — APPOINTMENT (OUTPATIENT)
Dept: MATERNAL FETAL MEDICINE | Facility: CLINIC | Age: 29
End: 2019-08-27

## 2019-08-29 ENCOUNTER — TRANSCRIPTION ENCOUNTER (OUTPATIENT)
Age: 29
End: 2019-08-29

## 2019-08-30 ENCOUNTER — INPATIENT (INPATIENT)
Facility: HOSPITAL | Age: 29
LOS: 2 days | Discharge: HOME | End: 2019-09-02
Attending: OBSTETRICS & GYNECOLOGY | Admitting: OBSTETRICS & GYNECOLOGY
Payer: MEDICAID

## 2019-08-30 ENCOUNTER — TRANSCRIPTION ENCOUNTER (OUTPATIENT)
Age: 29
End: 2019-08-30

## 2019-08-30 ENCOUNTER — NON-APPOINTMENT (OUTPATIENT)
Age: 29
End: 2019-08-30

## 2019-08-30 VITALS
DIASTOLIC BLOOD PRESSURE: 55 MMHG | RESPIRATION RATE: 12 BRPM | WEIGHT: 240.08 LBS | SYSTOLIC BLOOD PRESSURE: 113 MMHG | HEART RATE: 72 BPM | HEIGHT: 62 IN | OXYGEN SATURATION: 99 %

## 2019-08-30 DIAGNOSIS — O24.414 GESTATIONAL DIABETES MELLITUS IN PREGNANCY, INSULIN CONTROLLED: ICD-10-CM

## 2019-08-30 LAB
BASOPHILS # BLD AUTO: 0 K/UL — SIGNIFICANT CHANGE UP (ref 0–0.2)
BASOPHILS NFR BLD AUTO: 0.4 % — SIGNIFICANT CHANGE UP (ref 0–2)
BLD GP AB SCN SERPL QL: NEGATIVE — SIGNIFICANT CHANGE UP
EOSINOPHIL # BLD AUTO: 0.1 K/UL — SIGNIFICANT CHANGE UP (ref 0–0.5)
EOSINOPHIL NFR BLD AUTO: 0.9 % — SIGNIFICANT CHANGE UP (ref 0–6)
GLUCOSE BLDC GLUCOMTR-MCNC: 101 MG/DL — HIGH (ref 70–99)
GLUCOSE BLDC GLUCOMTR-MCNC: 101 MG/DL — HIGH (ref 70–99)
GLUCOSE BLDC GLUCOMTR-MCNC: 110 MG/DL — HIGH (ref 70–99)
GLUCOSE BLDC GLUCOMTR-MCNC: 138 MG/DL — HIGH (ref 70–99)
GLUCOSE BLDC GLUCOMTR-MCNC: 82 MG/DL — SIGNIFICANT CHANGE UP (ref 70–99)
GLUCOSE BLDC GLUCOMTR-MCNC: 87 MG/DL — SIGNIFICANT CHANGE UP (ref 70–99)
GLUCOSE BLDC GLUCOMTR-MCNC: 88 MG/DL — SIGNIFICANT CHANGE UP (ref 70–99)
GLUCOSE BLDC GLUCOMTR-MCNC: 91 MG/DL — SIGNIFICANT CHANGE UP (ref 70–99)
GLUCOSE BLDC GLUCOMTR-MCNC: 91 MG/DL — SIGNIFICANT CHANGE UP (ref 70–99)
GLUCOSE BLDC GLUCOMTR-MCNC: 92 MG/DL — SIGNIFICANT CHANGE UP (ref 70–99)
GLUCOSE BLDC GLUCOMTR-MCNC: 93 MG/DL — SIGNIFICANT CHANGE UP (ref 70–99)
GLUCOSE BLDC GLUCOMTR-MCNC: 96 MG/DL — SIGNIFICANT CHANGE UP (ref 70–99)
GLUCOSE BLDC GLUCOMTR-MCNC: 96 MG/DL — SIGNIFICANT CHANGE UP (ref 70–99)
GLUCOSE BLDC GLUCOMTR-MCNC: 97 MG/DL — SIGNIFICANT CHANGE UP (ref 70–99)
HCT VFR BLD CALC: 37.1 % — SIGNIFICANT CHANGE UP (ref 34.5–45)
HGB BLD-MCNC: 12.5 G/DL — SIGNIFICANT CHANGE UP (ref 11.5–15.5)
LYMPHOCYTES # BLD AUTO: 1.7 K/UL — SIGNIFICANT CHANGE UP (ref 1–3.3)
LYMPHOCYTES # BLD AUTO: 24.9 % — SIGNIFICANT CHANGE UP (ref 13–44)
MCHC RBC-ENTMCNC: 28.2 PG — SIGNIFICANT CHANGE UP (ref 27–34)
MCHC RBC-ENTMCNC: 33.6 GM/DL — SIGNIFICANT CHANGE UP (ref 32–36)
MCV RBC AUTO: 83.7 FL — SIGNIFICANT CHANGE UP (ref 80–100)
MONOCYTES # BLD AUTO: 0.4 K/UL — SIGNIFICANT CHANGE UP (ref 0–0.9)
MONOCYTES NFR BLD AUTO: 5.9 % — SIGNIFICANT CHANGE UP (ref 2–14)
NEUTROPHILS # BLD AUTO: 4.8 K/UL — SIGNIFICANT CHANGE UP (ref 1.8–7.4)
NEUTROPHILS NFR BLD AUTO: 67.9 % — SIGNIFICANT CHANGE UP (ref 43–77)
PLATELET # BLD AUTO: 256 K/UL — SIGNIFICANT CHANGE UP (ref 150–400)
RBC # BLD: 4.43 M/UL — SIGNIFICANT CHANGE UP (ref 3.8–5.2)
RBC # FLD: 15 % — HIGH (ref 10.3–14.5)
RH IG SCN BLD-IMP: NEGATIVE — SIGNIFICANT CHANGE UP
T PALLIDUM AB TITR SER: NEGATIVE — SIGNIFICANT CHANGE UP
WBC # BLD: 7 K/UL — SIGNIFICANT CHANGE UP (ref 3.8–10.5)
WBC # FLD AUTO: 7 K/UL — SIGNIFICANT CHANGE UP (ref 3.8–10.5)

## 2019-08-30 PROCEDURE — 59514 CESAREAN DELIVERY ONLY: CPT | Mod: U9

## 2019-08-30 RX ORDER — OXYCODONE HYDROCHLORIDE 5 MG/1
5 TABLET ORAL
Refills: 0 | Status: COMPLETED | OUTPATIENT
Start: 2019-08-30 | End: 2019-09-06

## 2019-08-30 RX ORDER — SODIUM CHLORIDE 9 MG/ML
1000 INJECTION INTRAMUSCULAR; INTRAVENOUS; SUBCUTANEOUS
Refills: 0 | Status: DISCONTINUED | OUTPATIENT
Start: 2019-08-30 | End: 2019-08-30

## 2019-08-30 RX ORDER — ONDANSETRON 8 MG/1
4 TABLET, FILM COATED ORAL ONCE
Refills: 0 | Status: DISCONTINUED | OUTPATIENT
Start: 2019-08-30 | End: 2019-09-02

## 2019-08-30 RX ORDER — IBUPROFEN 200 MG
600 TABLET ORAL EVERY 6 HOURS
Refills: 0 | Status: COMPLETED | OUTPATIENT
Start: 2019-08-30 | End: 2020-07-28

## 2019-08-30 RX ORDER — SODIUM CHLORIDE 9 MG/ML
1000 INJECTION, SOLUTION INTRAVENOUS
Refills: 0 | Status: DISCONTINUED | OUTPATIENT
Start: 2019-08-30 | End: 2019-08-30

## 2019-08-30 RX ORDER — DEXAMETHASONE 0.5 MG/5ML
4 ELIXIR ORAL EVERY 6 HOURS
Refills: 0 | Status: DISCONTINUED | OUTPATIENT
Start: 2019-08-30 | End: 2019-09-01

## 2019-08-30 RX ORDER — SODIUM CHLORIDE 9 MG/ML
1000 INJECTION, SOLUTION INTRAVENOUS
Refills: 0 | Status: DISCONTINUED | OUTPATIENT
Start: 2019-08-30 | End: 2019-09-02

## 2019-08-30 RX ORDER — OXYTOCIN 10 UNIT/ML
333.33 VIAL (ML) INJECTION
Qty: 20 | Refills: 0 | Status: DISCONTINUED | OUTPATIENT
Start: 2019-08-30 | End: 2019-09-02

## 2019-08-30 RX ORDER — ONDANSETRON 8 MG/1
4 TABLET, FILM COATED ORAL EVERY 6 HOURS
Refills: 0 | Status: DISCONTINUED | OUTPATIENT
Start: 2019-08-30 | End: 2019-09-01

## 2019-08-30 RX ORDER — TETANUS TOXOID, REDUCED DIPHTHERIA TOXOID AND ACELLULAR PERTUSSIS VACCINE, ADSORBED 5; 2.5; 8; 8; 2.5 [IU]/.5ML; [IU]/.5ML; UG/.5ML; UG/.5ML; UG/.5ML
0.5 SUSPENSION INTRAMUSCULAR ONCE
Refills: 0 | Status: DISCONTINUED | OUTPATIENT
Start: 2019-08-30 | End: 2019-09-02

## 2019-08-30 RX ORDER — CITRIC ACID/SODIUM CITRATE 300-500 MG
15 SOLUTION, ORAL ORAL EVERY 6 HOURS
Refills: 0 | Status: DISCONTINUED | OUTPATIENT
Start: 2019-08-30 | End: 2019-08-30

## 2019-08-30 RX ORDER — HEPARIN SODIUM 5000 [USP'U]/ML
10000 INJECTION INTRAVENOUS; SUBCUTANEOUS EVERY 8 HOURS
Refills: 0 | Status: DISCONTINUED | OUTPATIENT
Start: 2019-08-30 | End: 2019-08-31

## 2019-08-30 RX ORDER — NALOXONE HYDROCHLORIDE 4 MG/.1ML
0.1 SPRAY NASAL
Refills: 0 | Status: DISCONTINUED | OUTPATIENT
Start: 2019-08-30 | End: 2019-09-01

## 2019-08-30 RX ORDER — OXYCODONE HYDROCHLORIDE 5 MG/1
5 TABLET ORAL ONCE
Refills: 0 | Status: DISCONTINUED | OUTPATIENT
Start: 2019-08-30 | End: 2019-09-02

## 2019-08-30 RX ORDER — SIMETHICONE 80 MG/1
80 TABLET, CHEWABLE ORAL EVERY 4 HOURS
Refills: 0 | Status: DISCONTINUED | OUTPATIENT
Start: 2019-08-30 | End: 2019-09-02

## 2019-08-30 RX ORDER — IBUPROFEN 200 MG
1 TABLET ORAL
Qty: 0 | Refills: 0 | DISCHARGE
Start: 2019-08-30

## 2019-08-30 RX ORDER — METFORMIN HYDROCHLORIDE 850 MG/1
1 TABLET ORAL
Qty: 0 | Refills: 0 | DISCHARGE

## 2019-08-30 RX ORDER — FENTANYL/BUPIVACAINE/NS/PF 2MCG/ML-.1
5 PLASTIC BAG, INJECTION (ML) INJECTION
Refills: 0 | Status: DISCONTINUED | OUTPATIENT
Start: 2019-08-30 | End: 2019-09-01

## 2019-08-30 RX ORDER — INSULIN HUMAN 100 [IU]/ML
1 INJECTION, SOLUTION SUBCUTANEOUS
Qty: 100 | Refills: 0 | Status: DISCONTINUED | OUTPATIENT
Start: 2019-08-30 | End: 2019-08-31

## 2019-08-30 RX ORDER — DIPHENHYDRAMINE HCL 50 MG
25 CAPSULE ORAL EVERY 6 HOURS
Refills: 0 | Status: DISCONTINUED | OUTPATIENT
Start: 2019-08-30 | End: 2019-09-02

## 2019-08-30 RX ORDER — KETOROLAC TROMETHAMINE 30 MG/ML
30 SYRINGE (ML) INJECTION EVERY 6 HOURS
Refills: 0 | Status: DISCONTINUED | OUTPATIENT
Start: 2019-08-30 | End: 2019-08-30

## 2019-08-30 RX ORDER — MAGNESIUM HYDROXIDE 400 MG/1
30 TABLET, CHEWABLE ORAL
Refills: 0 | Status: DISCONTINUED | OUTPATIENT
Start: 2019-08-30 | End: 2019-09-02

## 2019-08-30 RX ORDER — ACETAMINOPHEN 500 MG
975 TABLET ORAL
Refills: 0 | Status: DISCONTINUED | OUTPATIENT
Start: 2019-08-30 | End: 2019-09-02

## 2019-08-30 RX ORDER — LANOLIN
1 OINTMENT (GRAM) TOPICAL EVERY 6 HOURS
Refills: 0 | Status: DISCONTINUED | OUTPATIENT
Start: 2019-08-30 | End: 2019-09-02

## 2019-08-30 RX ORDER — ACETAMINOPHEN 500 MG
3 TABLET ORAL
Qty: 0 | Refills: 0 | DISCHARGE
Start: 2019-08-30

## 2019-08-30 RX ORDER — DOCUSATE SODIUM 100 MG
100 CAPSULE ORAL
Refills: 0 | Status: DISCONTINUED | OUTPATIENT
Start: 2019-08-30 | End: 2019-09-02

## 2019-08-30 RX ORDER — GLYCERIN ADULT
1 SUPPOSITORY, RECTAL RECTAL AT BEDTIME
Refills: 0 | Status: DISCONTINUED | OUTPATIENT
Start: 2019-08-30 | End: 2019-09-02

## 2019-08-30 RX ORDER — KETOROLAC TROMETHAMINE 30 MG/ML
30 SYRINGE (ML) INJECTION EVERY 6 HOURS
Refills: 0 | Status: DISCONTINUED | OUTPATIENT
Start: 2019-08-30 | End: 2019-09-01

## 2019-08-30 RX ADMIN — INSULIN HUMAN 1 UNIT(S)/HR: 100 INJECTION, SOLUTION SUBCUTANEOUS at 02:11

## 2019-08-30 RX ADMIN — HEPARIN SODIUM 10000 UNIT(S): 5000 INJECTION INTRAVENOUS; SUBCUTANEOUS at 23:28

## 2019-08-30 RX ADMIN — Medication 30 MILLIGRAM(S): at 23:28

## 2019-08-30 RX ADMIN — Medication 0.25 MILLIGRAM(S): at 14:00

## 2019-08-30 RX ADMIN — Medication 30 MILLIGRAM(S): at 23:50

## 2019-08-30 NOTE — DISCHARGE NOTE OB - PATIENT PORTAL LINK FT
You can access the FollowMyHealth Patient Portal offered by [facility] by registering at the following website: [email or web address]. By joining [facility]’s FollowMyHealth portal, you will also be able to view your health information using other applications (apps) compatible with our system.

## 2019-08-30 NOTE — PRE-ANESTHESIA EVALUATION ADULT - NSANTHADDINFOFT_GEN_ALL_CORE
labor epidural has been explained in detail, including risks of HA, failure, nv injury.  All questions answered.

## 2019-08-30 NOTE — DISCHARGE NOTE OB - MEDICATION SUMMARY - MEDICATIONS TO STOP TAKING
I will STOP taking the medications listed below when I get home from the hospital:    metFORMIN 1000 mg oral tablet  -- 1 tab(s) orally

## 2019-08-30 NOTE — DISCHARGE NOTE OB - MEDICATION SUMMARY - MEDICATIONS TO TAKE
I will START or STAY ON the medications listed below when I get home from the hospital:    acetaminophen 325 mg oral tablet  -- 3 tab(s) by mouth   -- Indication: For Pain    ibuprofen 600 mg oral tablet  -- 1 tab(s) by mouth every 6 hours  -- Indication: For Pain    oxyCODONE 5 mg oral tablet  -- 1 tab(s) by mouth every 4 hours MDD:6  -- Caution federal law prohibits the transfer of this drug to any person other  than the person for whom it was prescribed.  It is very important that you take or use this exactly as directed.  Do not skip doses or discontinue unless directed by your doctor.  May cause drowsiness.  Alcohol may intensify this effect.  Use care when operating dangerous machinery.  This prescription cannot be refilled.  Using more of this medication than prescribed may cause serious breathing problems.    -- Indication: For Pain    Prenatal 1 oral capsule  -- 1 tab(s) by mouth once a day  -- Indication: For Vitamins

## 2019-08-30 NOTE — DISCHARGE NOTE OB - MATERIALS PROVIDED
Breastfeeding Mother’s Support Group Information/[place]  Immunization Record/Breastfeeding Log/Bottle Feeding Log/Back To Sleep Handout/Shaken Baby Prevention Handout/Birth Certificate Instructions/[facility]  Screening Program/Breastfeeding Guide and Packet/Guide to Postpartum Care/Discharge Medication Information for Patients and Families Pocket Guide/Vaccinations/[facility] Hearing Screen Program/MMR Vaccination (VIS Pub Date: 2012)

## 2019-08-30 NOTE — DISCHARGE NOTE OB - PROVIDER TOKENS
FREE:[LAST:[[facility]],PHONE:[phone]],FAX:[(   [phone]    -],ADDRESS:[[address] [place] [address]]]

## 2019-08-30 NOTE — DISCHARGE NOTE OB - CARE PROVIDER_API CALL
[facility],   [address] [place] [address]  [facility] [id number]  Phone: [phone]  Fax: (   )    -  Follow Up Time:

## 2019-08-30 NOTE — DISCHARGE NOTE OB - CARE PLAN
Principal Discharge DX:	 delivery delivered  Goal:	return to baseline health status  Assessment and plan of treatment:	After discharge, please stay on pelvic rest for 6 weeks, meaning no sexual intercourse, no tampons and no douching.  No driving for 2 weeks as women can loose a lot of blood during delivery and there is a possibility of being lightheaded/fainting.  No lifting objects heavier than a gallon of milk for two weeks.  Expect to have vaginal bleeding/spotting for up to six weeks.  The bleeding should get lighter and more white/light brown with time.  For bleeding soaking more than a pad an hour or passing clots greater than the size of your fist, come in to the emergency department.    Follow up in clinic in 2 weeks for incision check.  Call clinic for noticeable increase in redness or swelling at incision, discharge from incision, or opening of skin at incision site.

## 2019-08-30 NOTE — DISCHARGE NOTE OB - PLAN OF CARE
return to baseline health status After discharge, please stay on pelvic rest for 6 weeks, meaning no sexual intercourse, no tampons and no douching.  No driving for 2 weeks as women can loose a lot of blood during delivery and there is a possibility of being lightheaded/fainting.  No lifting objects heavier than a gallon of milk for two weeks.  Expect to have vaginal bleeding/spotting for up to six weeks.  The bleeding should get lighter and more white/light brown with time.  For bleeding soaking more than a pad an hour or passing clots greater than the size of your fist, come in to the emergency department.    Follow up in clinic in 2 weeks for incision check.  Call clinic for noticeable increase in redness or swelling at incision, discharge from incision, or opening of skin at incision site.

## 2019-08-30 NOTE — DISCHARGE NOTE OB - HOSPITAL COURSE
Patient had uncomplicated low transverse  section for NRFHT and arrest .  Please see operative note for details.  During postpartum course patient's vitals were stable, vaginal bleeding appropriate, and pain well controlled.  Post operation day one hematocrit was appropriate.  On day of discharge patient was ambulating, her pain controlled with oral medications, had adequate oral intake, and was voiding freely.  Discharge instructions and precautions were given.  Will return to clinic in 2 weeks for incision check.  Postpartum birth control plan is nothing. Pt counseled extensively on contraception options and the importance of not becoming pregnant for the next 18 months. Pt expressed understanding and states she will think about options and decide at her 2 or 6 week follow up appointments.

## 2019-08-31 LAB
BASOPHILS # BLD AUTO: 0.02 K/UL — SIGNIFICANT CHANGE UP (ref 0–0.2)
BASOPHILS NFR BLD AUTO: 0.2 % — SIGNIFICANT CHANGE UP (ref 0–2)
EOSINOPHIL # BLD AUTO: 0.02 K/UL — SIGNIFICANT CHANGE UP (ref 0–0.5)
EOSINOPHIL NFR BLD AUTO: 0.2 % — SIGNIFICANT CHANGE UP (ref 0–6)
HCT VFR BLD CALC: 26.6 % — LOW (ref 34.5–45)
HGB BLD-MCNC: 8.6 G/DL — LOW (ref 11.5–15.5)
IMM GRANULOCYTES NFR BLD AUTO: 0.4 % — SIGNIFICANT CHANGE UP (ref 0–1.5)
KLEIHAUER-BETKE CALCULATION: 0 % — SIGNIFICANT CHANGE UP (ref 0–0.3)
LYMPHOCYTES # BLD AUTO: 1.11 K/UL — SIGNIFICANT CHANGE UP (ref 1–3.3)
LYMPHOCYTES # BLD AUTO: 9.9 % — LOW (ref 13–44)
MCHC RBC-ENTMCNC: 27.7 PG — SIGNIFICANT CHANGE UP (ref 27–34)
MCHC RBC-ENTMCNC: 32.3 GM/DL — SIGNIFICANT CHANGE UP (ref 32–36)
MCV RBC AUTO: 85.8 FL — SIGNIFICANT CHANGE UP (ref 80–100)
MONOCYTES # BLD AUTO: 0.56 K/UL — SIGNIFICANT CHANGE UP (ref 0–0.9)
MONOCYTES NFR BLD AUTO: 5 % — SIGNIFICANT CHANGE UP (ref 2–14)
NEUTROPHILS # BLD AUTO: 9.42 K/UL — HIGH (ref 1.8–7.4)
NEUTROPHILS NFR BLD AUTO: 84.3 % — HIGH (ref 43–77)
PLATELET # BLD AUTO: 181 K/UL — SIGNIFICANT CHANGE UP (ref 150–400)
RBC # BLD: 3.1 M/UL — LOW (ref 3.8–5.2)
RBC # FLD: 16.2 % — HIGH (ref 10.3–14.5)
WBC # BLD: 11.17 K/UL — HIGH (ref 3.8–10.5)
WBC # FLD AUTO: 11.17 K/UL — HIGH (ref 3.8–10.5)

## 2019-08-31 RX ORDER — HEPARIN SODIUM 5000 [USP'U]/ML
10000 INJECTION INTRAVENOUS; SUBCUTANEOUS
Refills: 0 | Status: DISCONTINUED | OUTPATIENT
Start: 2019-08-31 | End: 2019-09-02

## 2019-08-31 RX ORDER — DOCUSATE SODIUM 100 MG
100 CAPSULE ORAL THREE TIMES A DAY
Refills: 0 | Status: DISCONTINUED | OUTPATIENT
Start: 2019-08-31 | End: 2019-09-02

## 2019-08-31 RX ORDER — FERROUS SULFATE 325(65) MG
325 TABLET ORAL THREE TIMES A DAY
Refills: 0 | Status: DISCONTINUED | OUTPATIENT
Start: 2019-08-31 | End: 2019-09-02

## 2019-08-31 RX ORDER — ASCORBIC ACID 60 MG
500 TABLET,CHEWABLE ORAL THREE TIMES A DAY
Refills: 0 | Status: DISCONTINUED | OUTPATIENT
Start: 2019-08-31 | End: 2019-09-02

## 2019-08-31 RX ADMIN — Medication 30 MILLIGRAM(S): at 05:39

## 2019-08-31 RX ADMIN — Medication 30 MILLIGRAM(S): at 12:44

## 2019-08-31 RX ADMIN — Medication 975 MILLIGRAM(S): at 15:41

## 2019-08-31 RX ADMIN — Medication 975 MILLIGRAM(S): at 15:11

## 2019-08-31 RX ADMIN — Medication 100 MILLIGRAM(S): at 15:11

## 2019-08-31 RX ADMIN — Medication 975 MILLIGRAM(S): at 21:17

## 2019-08-31 RX ADMIN — Medication 975 MILLIGRAM(S): at 09:09

## 2019-08-31 RX ADMIN — Medication 975 MILLIGRAM(S): at 03:20

## 2019-08-31 RX ADMIN — SODIUM CHLORIDE 125 MILLILITER(S): 9 INJECTION, SOLUTION INTRAVENOUS at 23:30

## 2019-08-31 RX ADMIN — Medication 30 MILLIGRAM(S): at 13:14

## 2019-08-31 RX ADMIN — Medication 30 MILLIGRAM(S): at 05:19

## 2019-08-31 RX ADMIN — Medication 975 MILLIGRAM(S): at 02:50

## 2019-08-31 RX ADMIN — Medication 325 MILLIGRAM(S): at 21:18

## 2019-08-31 RX ADMIN — Medication 500 MILLIGRAM(S): at 21:18

## 2019-08-31 RX ADMIN — Medication 30 MILLIGRAM(S): at 18:15

## 2019-08-31 RX ADMIN — Medication 100 MILLIGRAM(S): at 21:17

## 2019-08-31 RX ADMIN — SIMETHICONE 80 MILLIGRAM(S): 80 TABLET, CHEWABLE ORAL at 21:18

## 2019-08-31 RX ADMIN — Medication 30 MILLIGRAM(S): at 18:44

## 2019-08-31 RX ADMIN — HEPARIN SODIUM 10000 UNIT(S): 5000 INJECTION INTRAVENOUS; SUBCUTANEOUS at 12:44

## 2019-08-31 RX ADMIN — Medication 500 MILLIGRAM(S): at 15:11

## 2019-08-31 RX ADMIN — Medication 975 MILLIGRAM(S): at 08:39

## 2019-08-31 RX ADMIN — Medication 325 MILLIGRAM(S): at 15:11

## 2019-08-31 NOTE — CHART NOTE - NSCHARTNOTEFT_GEN_A_CORE
R1 OB Chart Note    S: 29yo  POD#1 s/p LTCS 2/2 Arrest of descent w/ [id number]. Patient evaluated bedside for passing a half-lemon sized clot. Currently patient is asymptomatic and states pain is well controlled. Denies lightheadedness, dizziness, N/V, SP, SOB, or persistent bleeding.     O:  Vitals:  Vital Signs Last 24 Hrs  T(C): 36.8 (30 Aug 2019 22:45), Max: 36.9 (30 Aug 2019 19:15)  T(F): 98.2 (30 Aug 2019 22:45), Max: 98.4 (30 Aug 2019 19:15)  HR: 105 (31 Aug 2019 02:35) (72 - 105)  BP: 105/65 (31 Aug 2019 02:35) (83/44 - 117/68)  BP(mean): 82 (30 Aug 2019 20:15) (73 - 82)  RR: 17 (31 Aug 2019 02:35) (12 - 20)  SpO2: 98% (31 Aug 2019 02:35) (98% - 100%)    MEDICATIONS  (STANDING):  acetaminophen   Tablet .. 975 milliGRAM(s) Oral <User Schedule>  diphtheria/tetanus/pertussis (acellular) Vaccine (ADAcel) 0.5 milliLiter(s) IntraMuscular once  fentaNYL (3 MICROgram(s)/mL) + BUpivacaine 0.01% in 0.9% Sodium Chloride PCEA 250 milliLiter(s) Epidural PCA Continuous  heparin  Injectable 39024 Unit(s) SubCutaneous every 8 hours  ibuprofen  Tablet. 600 milliGRAM(s) Oral every 6 hours  insulin regular Infusion 1 Unit(s)/Hr (1 mL/Hr) IV Continuous <Continuous>  ketorolac   Injectable 30 milliGRAM(s) IV Push every 6 hours  lactated ringers. 1000 milliLiter(s) (125 mL/Hr) IV Continuous <Continuous>  ondansetron Injectable 4 milliGRAM(s) IV Push once  oxytocin Infusion 333.333 milliUNIT(s)/Min (1000 mL/Hr) IV Continuous <Continuous>  oxytocin Infusion 333.333 milliUNIT(s)/Min (1000 mL/Hr) IV Continuous <Continuous>      Labs:  Blood type: O Negative  Rubella IgG: RPR: Negative                          12.5   7.0 >-----------< 256    ( 08-30 @ 01:14 )             37.1      Physical Exam:  General: NAD  CV: RRR w/o R/M/G  Lungs: CTA bl  Abdomen: soft, non-tender, non-distended, fundus firm  Vaginal: Lochia wnl. Bimanual exam revealed no clots with no active bleeding.  Extremities: No erythema/edema      A/P:  29yo  POD#1 s/p LTCS 2/2 Arrest of descent w/ [id number]. VSS, benign PE and no signs of active bleeding draws minor concern for active bleeding/atonic uterus at this time. Patient voiding appropriately.  - Continue with routine PP care.  - AM CBC    d/w Dr.Reisner Girish Delaney PGY1

## 2019-08-31 NOTE — PROVIDER CONTACT NOTE (CHANGE IN STATUS NOTIFICATION) - ACTION/TREATMENT ORDERED:
MD York at bedside examined pt and no further bleeding/clots were noted. R/P /65 , Pt. ambulated to the bathroom afterwards w/o any difficulty. Will continue to monitor pt.

## 2019-08-31 NOTE — PROVIDER CONTACT NOTE (OTHER) - ASSESSMENT
Orthostatics were WDL at 23:41 (see flowsheet), Pt. denied any dizziness or lightheadedness and did not complain of any pain, fundus check was firm.

## 2019-08-31 NOTE — PROVIDER CONTACT NOTE (CHANGE IN STATUS NOTIFICATION) - ASSESSMENT
Pt's previous orthostatics were WDL. Fundus is firm, bleeding WDL, no further clots noted and pt. denies any dizziness or lightheadedness.

## 2019-08-31 NOTE — PROGRESS NOTE ADULT - PROBLEM SELECTOR PLAN 1
- Continue regular diet.  - Increase ambulation.  - Continue PCEA for pain   - F/u AM CBC    Prashant Ya PGY1

## 2019-08-31 NOTE — CHART NOTE - NSCHARTNOTEFT_GEN_A_CORE
PA NOTE         POD#1         Vital Signs Last 24 Hrs  T(C): 36.9 (31 Aug 2019 09:17), Max: 36.9 (30 Aug 2019 19:15)  T(F): 98.4 (31 Aug 2019 09:17), Max: 98.4 (30 Aug 2019 19:15)  HR: 100 (31 Aug 2019 09:17) (78 - 105)  BP: 104/70 (31 Aug 2019 09:17) (83/44 - 117/68)  BP(mean): 82 (30 Aug 2019 20:15) (73 - 82)  RR: 18 (31 Aug 2019 09:17) (14 - 20)  SpO2: 99% (31 Aug 2019 09:17) (98% - 100%)               8.6    11.17 )-----------( 181      (  @ 10:21 )             26.6                12.5   7.0   )-----------( 256      (  @ 01:14 )             37.1           Assessment: Anemia secondary to acute blood loss due to delivery    Plan:  - Ferrous Sulfate, Colace, Vitamin C supplementation.  - Monitor for signs/symptoms of anemia.   - Does not require transfusion at this time

## 2019-09-01 LAB
APTT BLD: 27.2 SEC — LOW (ref 27.5–36.3)
HCT VFR BLD CALC: 27.9 % — LOW (ref 34.5–45)
HGB BLD-MCNC: 8.9 G/DL — LOW (ref 11.5–15.5)
INR BLD: 1.19 RATIO — HIGH (ref 0.88–1.16)
MCHC RBC-ENTMCNC: 27.8 PG — SIGNIFICANT CHANGE UP (ref 27–34)
MCHC RBC-ENTMCNC: 31.9 GM/DL — LOW (ref 32–36)
MCV RBC AUTO: 87.2 FL — SIGNIFICANT CHANGE UP (ref 80–100)
PLATELET # BLD AUTO: 229 K/UL — SIGNIFICANT CHANGE UP (ref 150–400)
PROTHROM AB SERPL-ACNC: 13.6 SEC — HIGH (ref 10–12.9)
RBC # BLD: 3.2 M/UL — LOW (ref 3.8–5.2)
RBC # FLD: 16.5 % — HIGH (ref 10.3–14.5)
WBC # BLD: 12.48 K/UL — HIGH (ref 3.8–10.5)
WBC # FLD AUTO: 12.48 K/UL — HIGH (ref 3.8–10.5)

## 2019-09-01 PROCEDURE — 88307 TISSUE EXAM BY PATHOLOGIST: CPT | Mod: 26

## 2019-09-01 RX ORDER — OXYCODONE HYDROCHLORIDE 5 MG/1
5 TABLET ORAL
Refills: 0 | Status: DISCONTINUED | OUTPATIENT
Start: 2019-09-01 | End: 2019-09-02

## 2019-09-01 RX ORDER — IBUPROFEN 200 MG
600 TABLET ORAL EVERY 6 HOURS
Refills: 0 | Status: DISCONTINUED | OUTPATIENT
Start: 2019-09-01 | End: 2019-09-02

## 2019-09-01 RX ADMIN — Medication 100 MILLIGRAM(S): at 05:36

## 2019-09-01 RX ADMIN — Medication 975 MILLIGRAM(S): at 15:00

## 2019-09-01 RX ADMIN — Medication 325 MILLIGRAM(S): at 21:10

## 2019-09-01 RX ADMIN — OXYCODONE HYDROCHLORIDE 5 MILLIGRAM(S): 5 TABLET ORAL at 21:46

## 2019-09-01 RX ADMIN — Medication 975 MILLIGRAM(S): at 03:45

## 2019-09-01 RX ADMIN — SIMETHICONE 80 MILLIGRAM(S): 80 TABLET, CHEWABLE ORAL at 21:10

## 2019-09-01 RX ADMIN — OXYCODONE HYDROCHLORIDE 5 MILLIGRAM(S): 5 TABLET ORAL at 13:16

## 2019-09-01 RX ADMIN — Medication 975 MILLIGRAM(S): at 14:21

## 2019-09-01 RX ADMIN — Medication 500 MILLIGRAM(S): at 05:36

## 2019-09-01 RX ADMIN — OXYCODONE HYDROCHLORIDE 5 MILLIGRAM(S): 5 TABLET ORAL at 18:46

## 2019-09-01 RX ADMIN — Medication 500 MILLIGRAM(S): at 21:11

## 2019-09-01 RX ADMIN — SIMETHICONE 80 MILLIGRAM(S): 80 TABLET, CHEWABLE ORAL at 05:37

## 2019-09-01 RX ADMIN — Medication 600 MILLIGRAM(S): at 17:50

## 2019-09-01 RX ADMIN — Medication 30 MILLIGRAM(S): at 05:36

## 2019-09-01 RX ADMIN — OXYCODONE HYDROCHLORIDE 5 MILLIGRAM(S): 5 TABLET ORAL at 17:23

## 2019-09-01 RX ADMIN — Medication 100 MILLIGRAM(S): at 13:16

## 2019-09-01 RX ADMIN — OXYCODONE HYDROCHLORIDE 5 MILLIGRAM(S): 5 TABLET ORAL at 11:00

## 2019-09-01 RX ADMIN — Medication 600 MILLIGRAM(S): at 23:49

## 2019-09-01 RX ADMIN — Medication 30 MILLIGRAM(S): at 05:55

## 2019-09-01 RX ADMIN — OXYCODONE HYDROCHLORIDE 5 MILLIGRAM(S): 5 TABLET ORAL at 22:40

## 2019-09-01 RX ADMIN — Medication 100 MILLIGRAM(S): at 21:11

## 2019-09-01 RX ADMIN — Medication 600 MILLIGRAM(S): at 11:40

## 2019-09-01 RX ADMIN — SIMETHICONE 80 MILLIGRAM(S): 80 TABLET, CHEWABLE ORAL at 13:18

## 2019-09-01 RX ADMIN — HEPARIN SODIUM 10000 UNIT(S): 5000 INJECTION INTRAVENOUS; SUBCUTANEOUS at 23:49

## 2019-09-01 RX ADMIN — HEPARIN SODIUM 10000 UNIT(S): 5000 INJECTION INTRAVENOUS; SUBCUTANEOUS at 11:07

## 2019-09-01 RX ADMIN — OXYCODONE HYDROCHLORIDE 5 MILLIGRAM(S): 5 TABLET ORAL at 10:29

## 2019-09-01 RX ADMIN — Medication 600 MILLIGRAM(S): at 17:21

## 2019-09-01 RX ADMIN — Medication 325 MILLIGRAM(S): at 05:36

## 2019-09-01 RX ADMIN — OXYCODONE HYDROCHLORIDE 5 MILLIGRAM(S): 5 TABLET ORAL at 16:09

## 2019-09-01 RX ADMIN — Medication 30 MILLIGRAM(S): at 00:35

## 2019-09-01 RX ADMIN — Medication 975 MILLIGRAM(S): at 21:45

## 2019-09-01 RX ADMIN — Medication 600 MILLIGRAM(S): at 11:07

## 2019-09-01 RX ADMIN — OXYCODONE HYDROCHLORIDE 5 MILLIGRAM(S): 5 TABLET ORAL at 18:59

## 2019-09-01 RX ADMIN — Medication 325 MILLIGRAM(S): at 13:16

## 2019-09-01 RX ADMIN — HEPARIN SODIUM 10000 UNIT(S): 5000 INJECTION INTRAVENOUS; SUBCUTANEOUS at 00:35

## 2019-09-01 RX ADMIN — OXYCODONE HYDROCHLORIDE 5 MILLIGRAM(S): 5 TABLET ORAL at 13:45

## 2019-09-01 RX ADMIN — Medication 975 MILLIGRAM(S): at 21:10

## 2019-09-01 RX ADMIN — Medication 975 MILLIGRAM(S): at 09:00

## 2019-09-01 RX ADMIN — Medication 30 MILLIGRAM(S): at 00:47

## 2019-09-01 RX ADMIN — Medication 975 MILLIGRAM(S): at 08:27

## 2019-09-01 RX ADMIN — Medication 975 MILLIGRAM(S): at 02:51

## 2019-09-01 RX ADMIN — Medication 1 TABLET(S): at 11:07

## 2019-09-01 RX ADMIN — Medication 500 MILLIGRAM(S): at 13:16

## 2019-09-01 NOTE — PROGRESS NOTE ADULT - PROBLEM SELECTOR PLAN 1
- Continue regular diet.  - Increase ambulation.  - D/c PCEA today and transition to PO pain medication with motrin, tylenol and oxycodone PRN.     Prashant Ya PGY1

## 2019-09-01 NOTE — PROGRESS NOTE ADULT - ATTENDING COMMENTS
Patient examined at bedside and agree with above assessment and plan Patient examined at bedside and agree with above assessment and plan  cbc this am   Wbc 12.4  Hb 8.9  Hct 27.9  Plt: 223

## 2019-09-02 VITALS
SYSTOLIC BLOOD PRESSURE: 94 MMHG | RESPIRATION RATE: 18 BRPM | DIASTOLIC BLOOD PRESSURE: 60 MMHG | TEMPERATURE: 98 F | OXYGEN SATURATION: 98 % | HEART RATE: 79 BPM

## 2019-09-02 LAB
BASOPHILS # BLD AUTO: 0 K/UL — SIGNIFICANT CHANGE UP (ref 0–0.2)
BASOPHILS NFR BLD AUTO: 0.3 % — SIGNIFICANT CHANGE UP (ref 0–2)
EOSINOPHIL # BLD AUTO: 0.1 K/UL — SIGNIFICANT CHANGE UP (ref 0–0.5)
EOSINOPHIL NFR BLD AUTO: 1.6 % — SIGNIFICANT CHANGE UP (ref 0–6)
HCT VFR BLD CALC: 25.4 % — LOW (ref 34.5–45)
HGB BLD-MCNC: 7.9 G/DL — LOW (ref 11.5–15.5)
LYMPHOCYTES # BLD AUTO: 1.8 K/UL — SIGNIFICANT CHANGE UP (ref 1–3.3)
LYMPHOCYTES # BLD AUTO: 25.4 % — SIGNIFICANT CHANGE UP (ref 13–44)
MCHC RBC-ENTMCNC: 27.1 PG — SIGNIFICANT CHANGE UP (ref 27–34)
MCHC RBC-ENTMCNC: 31.2 GM/DL — LOW (ref 32–36)
MCV RBC AUTO: 87 FL — SIGNIFICANT CHANGE UP (ref 80–100)
MONOCYTES # BLD AUTO: 0.3 K/UL — SIGNIFICANT CHANGE UP (ref 0–0.9)
MONOCYTES NFR BLD AUTO: 4.8 % — SIGNIFICANT CHANGE UP (ref 2–14)
NEUTROPHILS # BLD AUTO: 4.9 K/UL — SIGNIFICANT CHANGE UP (ref 1.8–7.4)
NEUTROPHILS NFR BLD AUTO: 67.9 % — SIGNIFICANT CHANGE UP (ref 43–77)
PLATELET # BLD AUTO: 230 K/UL — SIGNIFICANT CHANGE UP (ref 150–400)
RBC # BLD: 2.92 M/UL — LOW (ref 3.8–5.2)
RBC # FLD: 14.8 % — HIGH (ref 10.3–14.5)
WBC # BLD: 7.2 K/UL — SIGNIFICANT CHANGE UP (ref 3.8–10.5)
WBC # FLD AUTO: 7.2 K/UL — SIGNIFICANT CHANGE UP (ref 3.8–10.5)

## 2019-09-02 PROCEDURE — 86900 BLOOD TYPING SEROLOGIC ABO: CPT

## 2019-09-02 PROCEDURE — 85730 THROMBOPLASTIN TIME PARTIAL: CPT

## 2019-09-02 PROCEDURE — 82962 GLUCOSE BLOOD TEST: CPT

## 2019-09-02 PROCEDURE — C1889: CPT

## 2019-09-02 PROCEDURE — 86901 BLOOD TYPING SEROLOGIC RH(D): CPT

## 2019-09-02 PROCEDURE — 88307 TISSUE EXAM BY PATHOLOGIST: CPT

## 2019-09-02 PROCEDURE — 85610 PROTHROMBIN TIME: CPT

## 2019-09-02 PROCEDURE — 86850 RBC ANTIBODY SCREEN: CPT

## 2019-09-02 PROCEDURE — 85460 HEMOGLOBIN FETAL: CPT

## 2019-09-02 PROCEDURE — 86780 TREPONEMA PALLIDUM: CPT

## 2019-09-02 PROCEDURE — 59025 FETAL NON-STRESS TEST: CPT

## 2019-09-02 PROCEDURE — 59050 FETAL MONITOR W/REPORT: CPT

## 2019-09-02 PROCEDURE — 90707 MMR VACCINE SC: CPT

## 2019-09-02 PROCEDURE — 85027 COMPLETE CBC AUTOMATED: CPT

## 2019-09-02 RX ORDER — OXYCODONE HYDROCHLORIDE 5 MG/1
1 TABLET ORAL
Qty: 10 | Refills: 0
Start: 2019-09-02

## 2019-09-02 RX ADMIN — Medication 1 TABLET(S): at 12:34

## 2019-09-02 RX ADMIN — OXYCODONE HYDROCHLORIDE 5 MILLIGRAM(S): 5 TABLET ORAL at 00:51

## 2019-09-02 RX ADMIN — Medication 975 MILLIGRAM(S): at 03:24

## 2019-09-02 RX ADMIN — OXYCODONE HYDROCHLORIDE 5 MILLIGRAM(S): 5 TABLET ORAL at 05:01

## 2019-09-02 RX ADMIN — Medication 600 MILLIGRAM(S): at 12:34

## 2019-09-02 RX ADMIN — Medication 975 MILLIGRAM(S): at 09:33

## 2019-09-02 RX ADMIN — OXYCODONE HYDROCHLORIDE 5 MILLIGRAM(S): 5 TABLET ORAL at 04:15

## 2019-09-02 RX ADMIN — SIMETHICONE 80 MILLIGRAM(S): 80 TABLET, CHEWABLE ORAL at 06:03

## 2019-09-02 RX ADMIN — Medication 600 MILLIGRAM(S): at 06:49

## 2019-09-02 RX ADMIN — Medication 975 MILLIGRAM(S): at 09:03

## 2019-09-02 RX ADMIN — Medication 600 MILLIGRAM(S): at 00:49

## 2019-09-02 RX ADMIN — OXYCODONE HYDROCHLORIDE 5 MILLIGRAM(S): 5 TABLET ORAL at 02:00

## 2019-09-02 RX ADMIN — Medication 600 MILLIGRAM(S): at 06:03

## 2019-09-02 RX ADMIN — HEPARIN SODIUM 10000 UNIT(S): 5000 INJECTION INTRAVENOUS; SUBCUTANEOUS at 12:34

## 2019-09-02 RX ADMIN — OXYCODONE HYDROCHLORIDE 5 MILLIGRAM(S): 5 TABLET ORAL at 11:10

## 2019-09-02 RX ADMIN — Medication 975 MILLIGRAM(S): at 04:15

## 2019-09-02 RX ADMIN — Medication 0.5 MILLILITER(S): at 11:10

## 2019-09-02 RX ADMIN — Medication 100 MILLIGRAM(S): at 06:03

## 2019-09-02 RX ADMIN — Medication 325 MILLIGRAM(S): at 06:03

## 2019-09-02 RX ADMIN — Medication 500 MILLIGRAM(S): at 06:03

## 2019-09-02 NOTE — PROGRESS NOTE ADULT - PROBLEM SELECTOR PLAN 1
- Continue motrin, tylenol, oxycodone PRN for pain control.  - Increase ambulation  - Continue regular diet  - F/U AM CBC  - Discharge planning    Prashant Ya PGY1

## 2019-09-02 NOTE — PROGRESS NOTE ADULT - ATTENDING COMMENTS
Pt seen and examined  Agree with above   Section POD#3  Pt with no complaints; +Flatus; no dizziness; fainting or LOC    Subjective:  The patient feels well.  She is ambulating.   She is tolerating diet.  She denies nausea and vomiting.  She is voiding.  Her pain is controlled.  She reports normal postpartum bleeding.    VSS, afebrile  Vital Signs Last 24 Hrs    Gen: NAD  Breast: Soft, nontender, not engorged.  Abdomen: Soft, nontender, no distension , firm uterine fundus at umbilicus.  Incision: Clean, dry, and intact with steri strips; No edema; No erythema; No purulence  Pelvic: Mild Lochia rubra noted; No foul smell  Ext: No C/C/E, no calf pain    LABS:                        7.9    7.2   )-----------( 230      ( 02 Sep 2019 07:22 )             25.4                         8.9    12.48 )-----------( 229      ( 01 Sep 2019 11:23 )             27.9                         8.6    11.17 )-----------( 181      ( 31 Aug 2019 10:21 )             26.6       Allergies    No Known Allergies    Intolerances            A/P Pt S/P C/S POD#3.  Pt with asymptomatic anemia.  Pt in stable condition.    Will  1) Discharge to home  2) F/U in 1-2 weeks in [facility]    FLAKO Jaffe

## 2019-09-02 NOTE — PROGRESS NOTE ADULT - SUBJECTIVE AND OBJECTIVE BOX
Day 1 of Anesthesia Pain Management Service    SUBJECTIVE: I'm okay  Pain Scale Score	At rest: ___ 	With Activity: ___ 	[  x ] Refer to charted pain scores    THERAPY:  [ ] Epidural Bupivacaine 0.0625% and Hydromorphone 10 micrograms/mL  [ ] Epidural Ropivacaine 0.2% plain   [ x ] Epidural Bupivacaine 0.01 % and Fentanyl 3 micrograms/mL  (OB)    Demand dose 3 mL  Lockout  15 minutes   Continuous Rate 10mL    MEDICATIONS  (STANDING):  acetaminophen   Tablet .. 975 milliGRAM(s) Oral <User Schedule>  diphtheria/tetanus/pertussis (acellular) Vaccine (ADAcel) 0.5 milliLiter(s) IntraMuscular once  fentaNYL (3 MICROgram(s)/mL) + BUpivacaine 0.01% in 0.9% Sodium Chloride PCEA 250 milliLiter(s) Epidural PCA Continuous  heparin  Injectable 78679 Unit(s) SubCutaneous two times a day  ibuprofen  Tablet. 600 milliGRAM(s) Oral every 6 hours  ketorolac   Injectable 30 milliGRAM(s) IV Push every 6 hours  lactated ringers. 1000 milliLiter(s) (125 mL/Hr) IV Continuous <Continuous>  measles/mumps/rubella Vaccine 0.5 milliLiter(s) SubCutaneous once  ondansetron Injectable 4 milliGRAM(s) IV Push once  oxytocin Infusion 333.333 milliUNIT(s)/Min (1000 mL/Hr) IV Continuous <Continuous>  oxytocin Infusion 333.333 milliUNIT(s)/Min (1000 mL/Hr) IV Continuous <Continuous>    MEDICATIONS  (PRN):  dexamethasone  Injectable 4 milliGRAM(s) IV Push every 6 hours PRN Nausea  diphenhydrAMINE 25 milliGRAM(s) Oral every 6 hours PRN Itching  docusate sodium 100 milliGRAM(s) Oral two times a day PRN Stool softening  fentaNYL (3 MICROgram(s)/mL) + BUpivacaine 0.01% in 0.9% Sodium Chloride PCEA Rescue Clinician Bolus 5 milliLiter(s) Epidural every 15 minutes PRN Moderate Pain (4 - 6)  glycerin Suppository - Adult 1 Suppository(s) Rectal at bedtime PRN Constipation  lanolin Ointment 1 Application(s) Topical every 6 hours PRN Sore Nipples  magnesium hydroxide Suspension 30 milliLiter(s) Oral two times a day PRN Constipation  naloxone Injectable 0.1 milliGRAM(s) IV Push every 3 minutes PRN For ANY of the following changes in patient status:  A. RR LESS THAN 10 breaths per minute, B. Oxygen saturation LESS THAN 90%, C. Sedation score of 6  ondansetron Injectable 4 milliGRAM(s) IV Push every 6 hours PRN Nausea  oxyCODONE    IR 5 milliGRAM(s) Oral every 3 hours PRN Moderate to Severe Pain (4-10)  oxyCODONE    IR 5 milliGRAM(s) Oral once PRN Moderate to Severe Pain (4-10)  simethicone 80 milliGRAM(s) Chew every 4 hours PRN Gas      OBJECTIVE:    Assessment of Epidural Catheter Site: 	    [x  ] Dressing intact	[x ] Site non-tender	[x ] Site without erythema, discharge, edema  [x  ] Epidural tubing and connection checked	[ x] Gross neurological exam within normal limits  [ ] Catheter removed – tip intact		                          12.5   7.0   )-----------( 256      ( 30 Aug 2019 01:14 )             37.1     Vital Signs Last 24 Hrs  T(C): 36.9 (08-31-19 @ 09:17), Max: 36.9 (08-30-19 @ 19:15)  T(F): 98.4 (08-31-19 @ 09:17), Max: 98.4 (08-30-19 @ 19:15)  HR: 100 (08-31-19 @ 09:17) (72 - 105)  BP: 104/70 (08-31-19 @ 09:17) (83/44 - 117/68)  BP(mean): 82 (08-30-19 @ 20:15) (73 - 82)  RR: 18 (08-31-19 @ 09:17) (12 - 20)  SpO2: 99% (08-31-19 @ 09:17) (98% - 100%)      Sedation Score:	[x ] Alert	[ ] Drowsy	[ ] Arousable  [ ] Asleep  [ ] Unresponsive    Side Effects:	[ x] None	[ ] Nausea	[ ] Vomiting  [ ] Pruritus  		[ ] Weakness  [ ] Numbness  [ ] Other:    ASSESSMENT/ PLAN:    Therapy to  be:	[  x] Continue   [ ] Discontinued   [ ] Change to prn Analgesics    Documentation and Verification of current medications:  [ X ] Done	[ ] Not done, not eligible, reason:    Comments:
OB Postpartum Note:  Delivery, POD#3    S: 27yo POD#3 s/p LTCS. The patient feels well.  Pain is well controlled. She is tolerating a regular diet and passing flatus. She is voiding spontaneously, and ambulating without difficulty. Denies CP/SOB. Denies lightheadedness/dizziness. Denies N/V. Denies heavy vaginal bleeding or passage of large clots.         MEDICATIONS  (STANDING):  acetaminophen   Tablet .. 975 milliGRAM(s) Oral <User Schedule>  ascorbic acid 500 milliGRAM(s) Oral three times a day  diphtheria/tetanus/pertussis (acellular) Vaccine (ADAcel) 0.5 milliLiter(s) IntraMuscular once  docusate sodium 100 milliGRAM(s) Oral three times a day  ferrous    sulfate 325 milliGRAM(s) Oral three times a day  heparin  Injectable 20049 Unit(s) SubCutaneous two times a day  ibuprofen  Tablet. 600 milliGRAM(s) Oral every 6 hours  lactated ringers. 1000 milliLiter(s) (125 mL/Hr) IV Continuous <Continuous>  measles/mumps/rubella Vaccine 0.5 milliLiter(s) SubCutaneous once  ondansetron Injectable 4 milliGRAM(s) IV Push once  oxytocin Infusion 333.333 milliUNIT(s)/Min (1000 mL/Hr) IV Continuous <Continuous>  oxytocin Infusion 333.333 milliUNIT(s)/Min (1000 mL/Hr) IV Continuous <Continuous>  prenatal multivitamin 1 Tablet(s) Oral daily    MEDICATIONS  (PRN):  diphenhydrAMINE 25 milliGRAM(s) Oral every 6 hours PRN Itching  docusate sodium 100 milliGRAM(s) Oral two times a day PRN Stool softening  glycerin Suppository - Adult 1 Suppository(s) Rectal at bedtime PRN Constipation  lanolin Ointment 1 Application(s) Topical every 6 hours PRN Sore Nipples  magnesium hydroxide Suspension 30 milliLiter(s) Oral two times a day PRN Constipation  oxyCODONE    IR 5 milliGRAM(s) Oral once PRN Moderate to Severe Pain (4-10)  oxyCODONE    IR 5 milliGRAM(s) Oral every 3 hours PRN Moderate to Severe Pain (4-10)  simethicone 80 milliGRAM(s) Chew every 4 hours PRN Gas      LABS:  Blood type: O Negative  Rubella IgG: RPR: Negative                          8.9<L>   12.48<H> >-----------< 229    (  @ 11:23 )             27.9<L>                        8.6<L>   11.17<H> >-----------< 181    (  @ 10:21 )             26.6<L>      Physical exam:    Vital Signs Last 24 Hrs  T(C): 36.6 (02 Sep 2019 06:15), Max: 36.7 (01 Sep 2019 13:00)  T(F): 97.9 (02 Sep 2019 06:15), Max: 98 (01 Sep 2019 13:00)  HR: 80 (02 Sep 2019 06:15) (79 - 87)  BP: 102/69 (02 Sep 2019 06:15) (102/69 - 132/74)  BP(mean): --  RR: 18 (02 Sep 2019 06:15) (18 - 18)  SpO2: 98% (02 Sep 2019 06:15) (98% - 99%)    Gen: NAD  Abdomen: Soft, nontender, no distension , firm uterine fundus at umbilicus.  Incision: Clean, dry, and intact   Pelvic: Normal lochia noted  Ext: No calf tenderness
OB Progress Note:  Delivery, POD#1    S: 29yo POD#1 s/p LTCS, EBL: 1500 s/p txa. Her pain is well controlled. She is tolerating a regular diet and passing flatus. Denies N/V. Denies CP/SOB/lightheadedness/dizziness.   She is ambulating without difficulty.   Voiding spontaneously   Bandage removed this morning.       Labs:  Blood type: O Negative  Rubella IgG: RPR: Negative                          12.5   7.0 >-----------< 256    (  @ 01:14 )             37.1        PE:    Vital Signs Last 24 Hrs  T(C): 36.7 (31 Aug 2019 05:00), Max: 36.9 (30 Aug 2019 19:15)  T(F): 98.1 (31 Aug 2019 05:00), Max: 98.4 (30 Aug 2019 19:15)  HR: 82 (31 Aug 2019 05:00) (72 - 105)  BP: 90/60 (31 Aug 2019 05:00) (83/44 - 117/68)  BP(mean): 82 (30 Aug 2019 20:15) (73 - 82)  RR: 18 (31 Aug 2019 05:00) (12 - 20)  SpO2: 98% (31 Aug 2019 05:00) (98% - 100%)    General: NAD  Abdomen: Mildly distended, appropriately tender, Pfannenstiel incision c/d/i.  Extremities: No erythema, no pitting edema      - Continue regular diet.  - Increase ambulation.  - Continue PCEA for pain   - F/u AM CBC    Prashant Ya PGY1
OB Progress Note: LTCS, POD#2    S: 29yo POD#2 s/p LTCS. Pain is well controlled. She is tolerating a regular diet and passing flatus. She is voiding spontaneously, and ambulating without difficulty. Denies CP/SOB. Denies lightheadedness/dizziness. Denies N/V.        MEDICATIONS  (STANDING):  acetaminophen   Tablet .. 975 milliGRAM(s) Oral <User Schedule>  ascorbic acid 500 milliGRAM(s) Oral three times a day  diphtheria/tetanus/pertussis (acellular) Vaccine (ADAcel) 0.5 milliLiter(s) IntraMuscular once  docusate sodium 100 milliGRAM(s) Oral three times a day  ferrous    sulfate 325 milliGRAM(s) Oral three times a day  heparin  Injectable 50446 Unit(s) SubCutaneous two times a day  ibuprofen  Tablet. 600 milliGRAM(s) Oral every 6 hours  ketorolac   Injectable 30 milliGRAM(s) IV Push every 6 hours  lactated ringers. 1000 milliLiter(s) (125 mL/Hr) IV Continuous <Continuous>  measles/mumps/rubella Vaccine 0.5 milliLiter(s) SubCutaneous once  ondansetron Injectable 4 milliGRAM(s) IV Push once  oxytocin Infusion 333.333 milliUNIT(s)/Min (1000 mL/Hr) IV Continuous <Continuous>  oxytocin Infusion 333.333 milliUNIT(s)/Min (1000 mL/Hr) IV Continuous <Continuous>  prenatal multivitamin 1 Tablet(s) Oral daily      MEDICATIONS  (PRN):  diphenhydrAMINE 25 milliGRAM(s) Oral every 6 hours PRN Itching  docusate sodium 100 milliGRAM(s) Oral two times a day PRN Stool softening  glycerin Suppository - Adult 1 Suppository(s) Rectal at bedtime PRN Constipation  lanolin Ointment 1 Application(s) Topical every 6 hours PRN Sore Nipples  magnesium hydroxide Suspension 30 milliLiter(s) Oral two times a day PRN Constipation  oxyCODONE    IR 5 milliGRAM(s) Oral every 3 hours PRN Moderate to Severe Pain (4-10)  oxyCODONE    IR 5 milliGRAM(s) Oral once PRN Moderate to Severe Pain (4-10)  simethicone 80 milliGRAM(s) Chew every 4 hours PRN Gas      Labs:  Blood type: O Negative  Rubella IgG: RPR: Negative                          8.6<L>   11.17<H> >-----------< 181    ( 08-31 @ 10:21 )             26.6<L>                        12.5   7.0 >-----------< 256    ( 08-30 @ 01:14 )             37.1      PE:  Vital Signs Last 24 Hrs  T(C): 36.8 (01 Sep 2019 06:23), Max: 36.9 (31 Aug 2019 09:17)  T(F): 98.2 (01 Sep 2019 06:23), Max: 98.4 (31 Aug 2019 09:17)  HR: 84 (01 Sep 2019 06:23) (84 - 106)  BP: 96/66 (01 Sep 2019 06:23) (94/64 - 112/77)  BP(mean): --  RR: 18 (01 Sep 2019 06:23) (18 - 18)  SpO2: 98% (01 Sep 2019 06:23) (98% - 99%)    General: NAD  Abdomen: Soft, appropriately tender, incision c/d/i.  Extremities: No erythema, no pitting edema
Day 2 of Anesthesia Pain Management Service    SUBJECTIVE: I'm okay  Pain Scale Score	At rest: ___ 	With Activity: ___ 	[  x ] Refer to charted pain scores    THERAPY:  [ ] Epidural Bupivacaine 0.0625% and Hydromorphone 10 micrograms/mL  [ ] Epidural Ropivacaine 0.2% plain   [ x ] Epidural Bupivacaine 0.01 % and Fentanyl 3 micrograms/mL  (OB)    Demand dose 3 mL  Lockout  15 minutes   Continuous Rate 10mL    MEDICATIONS  (STANDING):  acetaminophen   Tablet .. 975 milliGRAM(s) Oral <User Schedule>  ascorbic acid 500 milliGRAM(s) Oral three times a day  diphtheria/tetanus/pertussis (acellular) Vaccine (ADAcel) 0.5 milliLiter(s) IntraMuscular once  docusate sodium 100 milliGRAM(s) Oral three times a day  fentaNYL (3 MICROgram(s)/mL) + BUpivacaine 0.01% in 0.9% Sodium Chloride PCEA 250 milliLiter(s) Epidural PCA Continuous  ferrous    sulfate 325 milliGRAM(s) Oral three times a day  heparin  Injectable 40402 Unit(s) SubCutaneous two times a day  ibuprofen  Tablet. 600 milliGRAM(s) Oral every 6 hours  ketorolac   Injectable 30 milliGRAM(s) IV Push every 6 hours  lactated ringers. 1000 milliLiter(s) (125 mL/Hr) IV Continuous <Continuous>  measles/mumps/rubella Vaccine 0.5 milliLiter(s) SubCutaneous once  ondansetron Injectable 4 milliGRAM(s) IV Push once  oxytocin Infusion 333.333 milliUNIT(s)/Min (1000 mL/Hr) IV Continuous <Continuous>  oxytocin Infusion 333.333 milliUNIT(s)/Min (1000 mL/Hr) IV Continuous <Continuous>  prenatal multivitamin 1 Tablet(s) Oral daily    MEDICATIONS  (PRN):  dexamethasone  Injectable 4 milliGRAM(s) IV Push every 6 hours PRN Nausea  diphenhydrAMINE 25 milliGRAM(s) Oral every 6 hours PRN Itching  docusate sodium 100 milliGRAM(s) Oral two times a day PRN Stool softening  fentaNYL (3 MICROgram(s)/mL) + BUpivacaine 0.01% in 0.9% Sodium Chloride PCEA Rescue Clinician Bolus 5 milliLiter(s) Epidural every 15 minutes PRN Moderate Pain (4 - 6)  glycerin Suppository - Adult 1 Suppository(s) Rectal at bedtime PRN Constipation  lanolin Ointment 1 Application(s) Topical every 6 hours PRN Sore Nipples  magnesium hydroxide Suspension 30 milliLiter(s) Oral two times a day PRN Constipation  naloxone Injectable 0.1 milliGRAM(s) IV Push every 3 minutes PRN For ANY of the following changes in patient status:  A. RR LESS THAN 10 breaths per minute, B. Oxygen saturation LESS THAN 90%, C. Sedation score of 6  ondansetron Injectable 4 milliGRAM(s) IV Push every 6 hours PRN Nausea  oxyCODONE    IR 5 milliGRAM(s) Oral every 3 hours PRN Moderate to Severe Pain (4-10)  oxyCODONE    IR 5 milliGRAM(s) Oral once PRN Moderate to Severe Pain (4-10)  simethicone 80 milliGRAM(s) Chew every 4 hours PRN Gas      OBJECTIVE:    Assessment of Epidural Catheter Site: 	    [  ] Dressing intact	[x ] Site non-tender	[x ] Site without erythema, discharge, edema  [  ] Epidural tubing and connection checked	[ x] Gross neurological exam within normal limits  [ ] Catheter removed – tip intact		    PT/INR - ( 01 Sep 2019 01:05 )   PT: 13.6 sec;   INR: 1.19 ratio         PTT - ( 01 Sep 2019 01:05 )  PTT:27.2 sec                      8.6    11.17 )-----------( 181      ( 31 Aug 2019 10:21 )             26.6     Vital Signs Last 24 Hrs  T(C): 36.8 (09-01-19 @ 06:23), Max: 36.9 (08-31-19 @ 09:17)  T(F): 98.2 (09-01-19 @ 06:23), Max: 98.4 (08-31-19 @ 09:17)  HR: 84 (09-01-19 @ 06:23) (84 - 106)  BP: 96/66 (09-01-19 @ 06:23) (94/64 - 112/77)  BP(mean): --  RR: 18 (09-01-19 @ 06:23) (18 - 18)  SpO2: 98% (09-01-19 @ 06:23) (98% - 99%)      Sedation Score:	[x ] Alert	[ ] Drowsy	[ ] Arousable  [ ] Asleep  [ ] Unresponsive    Side Effects:	[ x] None	[ ] Nausea	[ ] Vomiting  [ ] Pruritus  		[ ] Weakness  [ ] Numbness  [ ] Other:    ASSESSMENT/ PLAN:    Therapy to  be:	[  ] Continue   [ X] Discontinued   [x ] Change to prn Analgesics    Documentation and Verification of current medications:  [ X ] Done	[ ] Not done, not eligible, reason:    Comments:

## 2019-09-09 LAB — SURGICAL PATHOLOGY STUDY: SIGNIFICANT CHANGE UP

## 2019-09-12 ENCOUNTER — INPATIENT (INPATIENT)
Facility: HOSPITAL | Age: 29
LOS: 7 days | Discharge: HOME | DRG: 776 | End: 2019-09-20
Attending: OBSTETRICS & GYNECOLOGY | Admitting: OBSTETRICS & GYNECOLOGY
Payer: MEDICAID

## 2019-09-12 ENCOUNTER — OTHER (OUTPATIENT)
Age: 29
End: 2019-09-12

## 2019-09-12 VITALS
HEART RATE: 111 BPM | OXYGEN SATURATION: 99 % | DIASTOLIC BLOOD PRESSURE: 62 MMHG | HEIGHT: 62 IN | WEIGHT: 227.96 LBS | SYSTOLIC BLOOD PRESSURE: 92 MMHG | RESPIRATION RATE: 16 BRPM | TEMPERATURE: 100 F

## 2019-09-12 DIAGNOSIS — Z98.891 HISTORY OF UTERINE SCAR FROM PREVIOUS SURGERY: Chronic | ICD-10-CM

## 2019-09-12 DIAGNOSIS — A41.9 SEPSIS, UNSPECIFIED ORGANISM: ICD-10-CM

## 2019-09-12 LAB
ALBUMIN SERPL ELPH-MCNC: 3.2 G/DL — LOW (ref 3.3–5)
ALP SERPL-CCNC: 138 U/L — HIGH (ref 40–120)
ALT FLD-CCNC: 23 U/L — SIGNIFICANT CHANGE UP (ref 10–45)
ANION GAP SERPL CALC-SCNC: 15 MMOL/L — SIGNIFICANT CHANGE UP (ref 5–17)
ANION GAP SERPL CALC-SCNC: 17 MMOL/L — SIGNIFICANT CHANGE UP (ref 5–17)
APPEARANCE UR: CLEAR — SIGNIFICANT CHANGE UP
APTT BLD: 27.4 SEC — LOW (ref 27.5–36.3)
AST SERPL-CCNC: 37 U/L — SIGNIFICANT CHANGE UP (ref 10–40)
BACTERIA # UR AUTO: NEGATIVE — SIGNIFICANT CHANGE UP
BASE EXCESS BLDV CALC-SCNC: 0.2 MMOL/L — SIGNIFICANT CHANGE UP (ref -2–2)
BASOPHILS # BLD AUTO: 0 K/UL — SIGNIFICANT CHANGE UP (ref 0–0.2)
BASOPHILS NFR BLD AUTO: 0 % — SIGNIFICANT CHANGE UP (ref 0–2)
BILIRUB SERPL-MCNC: 0.4 MG/DL — SIGNIFICANT CHANGE UP (ref 0.2–1.2)
BILIRUB UR-MCNC: NEGATIVE — SIGNIFICANT CHANGE UP
BUN SERPL-MCNC: 10 MG/DL — SIGNIFICANT CHANGE UP (ref 7–23)
BUN SERPL-MCNC: 8 MG/DL — SIGNIFICANT CHANGE UP (ref 7–23)
CA-I SERPL-SCNC: 1.15 MMOL/L — SIGNIFICANT CHANGE UP (ref 1.12–1.3)
CALCIUM SERPL-MCNC: 8.9 MG/DL — SIGNIFICANT CHANGE UP (ref 8.4–10.5)
CALCIUM SERPL-MCNC: 9.3 MG/DL — SIGNIFICANT CHANGE UP (ref 8.4–10.5)
CHLORIDE BLDV-SCNC: 105 MMOL/L — SIGNIFICANT CHANGE UP (ref 96–108)
CHLORIDE SERPL-SCNC: 102 MMOL/L — SIGNIFICANT CHANGE UP (ref 96–108)
CHLORIDE SERPL-SCNC: 103 MMOL/L — SIGNIFICANT CHANGE UP (ref 96–108)
CO2 BLDV-SCNC: 25 MMOL/L — SIGNIFICANT CHANGE UP (ref 22–30)
CO2 SERPL-SCNC: 20 MMOL/L — LOW (ref 22–31)
CO2 SERPL-SCNC: 20 MMOL/L — LOW (ref 22–31)
COLOR SPEC: YELLOW — SIGNIFICANT CHANGE UP
CREAT SERPL-MCNC: 0.57 MG/DL — SIGNIFICANT CHANGE UP (ref 0.5–1.3)
CREAT SERPL-MCNC: 0.6 MG/DL — SIGNIFICANT CHANGE UP (ref 0.5–1.3)
DIFF PNL FLD: ABNORMAL
EOSINOPHIL # BLD AUTO: 0.1 K/UL — SIGNIFICANT CHANGE UP (ref 0–0.5)
EOSINOPHIL NFR BLD AUTO: 0.6 % — SIGNIFICANT CHANGE UP (ref 0–6)
EPI CELLS # UR: 1 /HPF — SIGNIFICANT CHANGE UP
GAS PNL BLDV: 136 MMOL/L — SIGNIFICANT CHANGE UP (ref 135–145)
GAS PNL BLDV: SIGNIFICANT CHANGE UP
GAS PNL BLDV: SIGNIFICANT CHANGE UP
GLUCOSE BLDV-MCNC: 98 MG/DL — SIGNIFICANT CHANGE UP (ref 70–99)
GLUCOSE SERPL-MCNC: 89 MG/DL — SIGNIFICANT CHANGE UP (ref 70–99)
GLUCOSE SERPL-MCNC: 99 MG/DL — SIGNIFICANT CHANGE UP (ref 70–99)
GLUCOSE UR QL: NEGATIVE — SIGNIFICANT CHANGE UP
HCO3 BLDV-SCNC: 24 MMOL/L — SIGNIFICANT CHANGE UP (ref 21–29)
HCT VFR BLD CALC: 30.1 % — LOW (ref 34.5–45)
HCT VFR BLDA CALC: 30 % — LOW (ref 39–50)
HGB BLD CALC-MCNC: 9.7 G/DL — LOW (ref 11.5–15.5)
HGB BLD-MCNC: 9.6 G/DL — LOW (ref 11.5–15.5)
HYALINE CASTS # UR AUTO: 5 /LPF — HIGH (ref 0–2)
INR BLD: 1.29 RATIO — HIGH (ref 0.88–1.16)
KETONES UR-MCNC: NEGATIVE — SIGNIFICANT CHANGE UP
LACTATE BLDV-MCNC: 1.8 MMOL/L — SIGNIFICANT CHANGE UP (ref 0.7–2)
LEUKOCYTE ESTERASE UR-ACNC: ABNORMAL
LYMPHOCYTES # BLD AUTO: 1.9 K/UL — SIGNIFICANT CHANGE UP (ref 1–3.3)
LYMPHOCYTES # BLD AUTO: 13.8 % — SIGNIFICANT CHANGE UP (ref 13–44)
MCHC RBC-ENTMCNC: 27.2 PG — SIGNIFICANT CHANGE UP (ref 27–34)
MCHC RBC-ENTMCNC: 32 GM/DL — SIGNIFICANT CHANGE UP (ref 32–36)
MCV RBC AUTO: 84.9 FL — SIGNIFICANT CHANGE UP (ref 80–100)
MONOCYTES # BLD AUTO: 0.9 K/UL — SIGNIFICANT CHANGE UP (ref 0–0.9)
MONOCYTES NFR BLD AUTO: 6.8 % — SIGNIFICANT CHANGE UP (ref 2–14)
NEUTROPHILS # BLD AUTO: 10.9 K/UL — HIGH (ref 1.8–7.4)
NEUTROPHILS NFR BLD AUTO: 78.9 % — HIGH (ref 43–77)
NITRITE UR-MCNC: NEGATIVE — SIGNIFICANT CHANGE UP
PCO2 BLDV: 39 MMHG — SIGNIFICANT CHANGE UP (ref 35–50)
PH BLDV: 7.41 — SIGNIFICANT CHANGE UP (ref 7.35–7.45)
PH UR: 6 — SIGNIFICANT CHANGE UP (ref 5–8)
PLAT MORPH BLD: NORMAL — SIGNIFICANT CHANGE UP
PLATELET # BLD AUTO: 661 K/UL — HIGH (ref 150–400)
PO2 BLDV: 40 MMHG — SIGNIFICANT CHANGE UP (ref 25–45)
POTASSIUM BLDV-SCNC: 4.6 MMOL/L — SIGNIFICANT CHANGE UP (ref 3.5–5.3)
POTASSIUM SERPL-MCNC: 3.9 MMOL/L — SIGNIFICANT CHANGE UP (ref 3.5–5.3)
POTASSIUM SERPL-MCNC: 4.5 MMOL/L — SIGNIFICANT CHANGE UP (ref 3.5–5.3)
POTASSIUM SERPL-SCNC: 3.9 MMOL/L — SIGNIFICANT CHANGE UP (ref 3.5–5.3)
POTASSIUM SERPL-SCNC: 4.5 MMOL/L — SIGNIFICANT CHANGE UP (ref 3.5–5.3)
PROT SERPL-MCNC: 7.6 G/DL — SIGNIFICANT CHANGE UP (ref 6–8.3)
PROT UR-MCNC: ABNORMAL
PROTHROM AB SERPL-ACNC: 14.9 SEC — HIGH (ref 10–12.9)
RBC # BLD: 3.54 M/UL — LOW (ref 3.8–5.2)
RBC # FLD: 14.9 % — HIGH (ref 10.3–14.5)
RBC BLD AUTO: SIGNIFICANT CHANGE UP
RBC CASTS # UR COMP ASSIST: 8 /HPF — HIGH (ref 0–4)
SAO2 % BLDV: 68 % — SIGNIFICANT CHANGE UP (ref 67–88)
SODIUM SERPL-SCNC: 138 MMOL/L — SIGNIFICANT CHANGE UP (ref 135–145)
SODIUM SERPL-SCNC: 139 MMOL/L — SIGNIFICANT CHANGE UP (ref 135–145)
SP GR SPEC: 1.03 — HIGH (ref 1.01–1.02)
UROBILINOGEN FLD QL: ABNORMAL
WBC # BLD: 13.8 K/UL — HIGH (ref 3.8–10.5)
WBC # FLD AUTO: 13.8 K/UL — HIGH (ref 3.8–10.5)
WBC UR QL: 45 /HPF — HIGH (ref 0–5)

## 2019-09-12 PROCEDURE — 93010 ELECTROCARDIOGRAM REPORT: CPT

## 2019-09-12 PROCEDURE — 71045 X-RAY EXAM CHEST 1 VIEW: CPT | Mod: 26

## 2019-09-12 PROCEDURE — 99291 CRITICAL CARE FIRST HOUR: CPT

## 2019-09-12 PROCEDURE — 99292 CRITICAL CARE ADDL 30 MIN: CPT

## 2019-09-12 PROCEDURE — 74177 CT ABD & PELVIS W/CONTRAST: CPT | Mod: 26

## 2019-09-12 RX ORDER — ACETAMINOPHEN 500 MG
975 TABLET ORAL ONCE
Refills: 0 | Status: COMPLETED | OUTPATIENT
Start: 2019-09-12 | End: 2019-09-12

## 2019-09-12 RX ORDER — CEFTRIAXONE 500 MG/1
1000 INJECTION, POWDER, FOR SOLUTION INTRAMUSCULAR; INTRAVENOUS ONCE
Refills: 0 | Status: COMPLETED | OUTPATIENT
Start: 2019-09-12 | End: 2019-09-12

## 2019-09-12 RX ORDER — KETOROLAC TROMETHAMINE 30 MG/ML
15 SYRINGE (ML) INJECTION ONCE
Refills: 0 | Status: DISCONTINUED | OUTPATIENT
Start: 2019-09-12 | End: 2019-09-12

## 2019-09-12 RX ORDER — SODIUM CHLORIDE 9 MG/ML
3200 INJECTION, SOLUTION INTRAVENOUS ONCE
Refills: 0 | Status: COMPLETED | OUTPATIENT
Start: 2019-09-12 | End: 2019-09-12

## 2019-09-12 RX ADMIN — CEFTRIAXONE 100 MILLIGRAM(S): 500 INJECTION, POWDER, FOR SOLUTION INTRAMUSCULAR; INTRAVENOUS at 19:42

## 2019-09-12 RX ADMIN — Medication 975 MILLIGRAM(S): at 18:30

## 2019-09-12 RX ADMIN — Medication 15 MILLIGRAM(S): at 20:45

## 2019-09-12 RX ADMIN — SODIUM CHLORIDE 3200 MILLILITER(S): 9 INJECTION, SOLUTION INTRAVENOUS at 18:31

## 2019-09-12 NOTE — ED ADULT NURSE NOTE - OBJECTIVE STATEMENT
27 y/o female with no PMH c/o intermittent fevers 100-103F with headaches for the past 3 days. Headache is 10/10. Recent  2 weeks ago (first ). Also describes back pain with chills. Denies difficulty urinating, abdominal pain, or vaginal bleeding. Pt is ambulatory with steady gait. iv in place no signs of infiltration, skin is warm dry pink intact.

## 2019-09-12 NOTE — ED ADULT NURSE NOTE - NSIMPLEMENTINTERV_GEN_ALL_ED
Implemented All Universal Safety Interventions:  [place] to call system. Call bell, personal items and telephone within reach. Instruct patient to call for assistance. Room bathroom lighting operational. Non-slip footwear when patient is off stretcher. Physically safe environment: no spills, clutter or unnecessary equipment. Stretcher in lowest position, wheels locked, appropriate side rails in place.

## 2019-09-12 NOTE — ED ADULT NURSE REASSESSMENT NOTE - NS ED NURSE REASSESS COMMENT FT1
Mobile charge notified, Pt to be moved to main ED as soon as a space becomes available.
Nurse manager notified with change PTs vital signs, Pt to be moved to CC.
pt is aox4, vitals stable, resting comfortably, iv in place no signs of infiltration, skin is warm dry pink intact, denies any pain
pt is aox4, vitals stable, resting comfortably, iv in place no signs of infiltration, skin is warm dry pink intact, denies any pain
pt is aox4, vitals stable, resting comfortably, iv in place no signs of infiltration, skin is warm dry pink intact, denies any pain. Pt still has fever. Obgyn at bedside.
RN spoke with mobile RN, Pt to be transferred to bed as soon as a room is available.

## 2019-09-12 NOTE — ED PROVIDER NOTE - OBJECTIVE STATEMENT
2 weeks ago patient with LTCS presenting now with fevers and abdominal pain. Denies significant discharge or drainage but has been feeling increasing fatigue and malaise, now with fevers. No cough, congestion, headache, or nausea/vomiting.

## 2019-09-12 NOTE — ED PROVIDER NOTE - CARE PLAN
Principal Discharge DX:	Sepsis Principal Discharge DX:	Sepsis  Secondary Diagnosis:	Fever  Secondary Diagnosis:	Abdominal pain

## 2019-09-12 NOTE — ED PROVIDER NOTE - ATTENDING CONTRIBUTION TO CARE
------------ATTENDING NOTE------------  pt w/  c/o 48 hrs intermittent fevers, general malaise, diffuse myalgias, complicated by post partum / complicated  2 wk ago, feels dehydrated and near passing out at times, awaiting labs/imaging and room for exam and close reassessments -->  - Earl Keys MD   ----------------------------------------------- ------------ATTENDING NOTE------------  pt w/  c/o 48 hrs intermittent fevers, general malaise, diffuse myalgias, complicated by post partum / complicated  2 wk ago, feels dehydrated and near passing out at times, awaiting labs/imaging and room for exam and close reassessments --> labs c/w infectious process, will tx as pyelonephritis, has tension-type headache w/o meningismus and normal neurological exam, awaiting OBGYN recs and continued close reassessments -->  - Earl Keys MD   ----------------------------------------------- ------------ATTENDING NOTE------------  pt w/  c/o 48 hrs intermittent fevers, general malaise, diffuse myalgias, complicated by post partum / complicated  2 wk ago, feels dehydrated and near passing out at times, awaiting labs/imaging and room for exam and close reassessments --> labs c/w infectious process, will tx as pyelonephritis, has tension-type headache w/o meningismus and normal neurological exam, awaiting OBGYN recs and continued close reassessments --> improving w/ IVF, fever reduction, tolerating small amt PO after imaging, improved VS/exams, d/w OBGYN for admission and continued tx/work up.  - Earl Keys MD   -----------------------------------------------

## 2019-09-13 ENCOUNTER — APPOINTMENT (OUTPATIENT)
Dept: MATERNAL FETAL MEDICINE | Facility: CLINIC | Age: 29
End: 2019-09-13

## 2019-09-13 LAB
BASOPHILS # BLD AUTO: 0 K/UL — SIGNIFICANT CHANGE UP (ref 0–0.2)
BASOPHILS NFR BLD AUTO: 0.3 % — SIGNIFICANT CHANGE UP (ref 0–2)
CULTURE RESULTS: SIGNIFICANT CHANGE UP
EOSINOPHIL # BLD AUTO: 0.1 K/UL — SIGNIFICANT CHANGE UP (ref 0–0.5)
EOSINOPHIL NFR BLD AUTO: 0.6 % — SIGNIFICANT CHANGE UP (ref 0–6)
HCT VFR BLD CALC: 26.3 % — LOW (ref 34.5–45)
HGB BLD-MCNC: 8.5 G/DL — LOW (ref 11.5–15.5)
LYMPHOCYTES # BLD AUTO: 16 % — SIGNIFICANT CHANGE UP (ref 13–44)
LYMPHOCYTES # BLD AUTO: 2 K/UL — SIGNIFICANT CHANGE UP (ref 1–3.3)
MCHC RBC-ENTMCNC: 27.5 PG — SIGNIFICANT CHANGE UP (ref 27–34)
MCHC RBC-ENTMCNC: 32.4 GM/DL — SIGNIFICANT CHANGE UP (ref 32–36)
MCV RBC AUTO: 84.8 FL — SIGNIFICANT CHANGE UP (ref 80–100)
MONOCYTES # BLD AUTO: 1 K/UL — HIGH (ref 0–0.9)
MONOCYTES NFR BLD AUTO: 7.8 % — SIGNIFICANT CHANGE UP (ref 2–14)
NEUTROPHILS # BLD AUTO: 9.3 K/UL — HIGH (ref 1.8–7.4)
NEUTROPHILS NFR BLD AUTO: 75.3 % — SIGNIFICANT CHANGE UP (ref 43–77)
PLATELET # BLD AUTO: 552 K/UL — HIGH (ref 150–400)
RBC # BLD: 3.1 M/UL — LOW (ref 3.8–5.2)
RBC # FLD: 14.6 % — HIGH (ref 10.3–14.5)
SPECIMEN SOURCE: SIGNIFICANT CHANGE UP
WBC # BLD: 12.3 K/UL — HIGH (ref 3.8–10.5)
WBC # FLD AUTO: 12.3 K/UL — HIGH (ref 3.8–10.5)

## 2019-09-13 PROCEDURE — 76942 ECHO GUIDE FOR BIOPSY: CPT | Mod: 26

## 2019-09-13 PROCEDURE — 10160 PNXR ASPIR ABSC HMTMA BULLA: CPT

## 2019-09-13 RX ORDER — ACETAMINOPHEN 500 MG
975 TABLET ORAL EVERY 6 HOURS
Refills: 0 | Status: DISCONTINUED | OUTPATIENT
Start: 2019-09-13 | End: 2019-09-20

## 2019-09-13 RX ORDER — GENTAMICIN SULFATE 40 MG/ML
500 VIAL (ML) INJECTION EVERY 24 HOURS
Refills: 0 | Status: DISCONTINUED | OUTPATIENT
Start: 2019-09-13 | End: 2019-09-15

## 2019-09-13 RX ORDER — ACETAMINOPHEN 500 MG
1000 TABLET ORAL ONCE
Refills: 0 | Status: COMPLETED | OUTPATIENT
Start: 2019-09-13 | End: 2019-09-13

## 2019-09-13 RX ORDER — METOCLOPRAMIDE HCL 10 MG
10 TABLET ORAL ONCE
Refills: 0 | Status: COMPLETED | OUTPATIENT
Start: 2019-09-13 | End: 2019-09-13

## 2019-09-13 RX ORDER — HEPARIN SODIUM 5000 [USP'U]/ML
5000 INJECTION INTRAVENOUS; SUBCUTANEOUS EVERY 12 HOURS
Refills: 0 | Status: DISCONTINUED | OUTPATIENT
Start: 2019-09-13 | End: 2019-09-15

## 2019-09-13 RX ORDER — HEPARIN SODIUM 5000 [USP'U]/ML
5000 INJECTION INTRAVENOUS; SUBCUTANEOUS EVERY 12 HOURS
Refills: 0 | Status: DISCONTINUED | OUTPATIENT
Start: 2019-09-13 | End: 2019-09-13

## 2019-09-13 RX ORDER — DIPHENHYDRAMINE HCL 50 MG
25 CAPSULE ORAL ONCE
Refills: 0 | Status: COMPLETED | OUTPATIENT
Start: 2019-09-13 | End: 2019-09-13

## 2019-09-13 RX ORDER — SODIUM CHLORIDE 9 MG/ML
1000 INJECTION, SOLUTION INTRAVENOUS
Refills: 0 | Status: DISCONTINUED | OUTPATIENT
Start: 2019-09-13 | End: 2019-09-14

## 2019-09-13 RX ADMIN — Medication 100 MILLIGRAM(S): at 10:07

## 2019-09-13 RX ADMIN — Medication 400 MILLIGRAM(S): at 20:59

## 2019-09-13 RX ADMIN — Medication 300 MILLIGRAM(S): at 03:00

## 2019-09-13 RX ADMIN — Medication 1000 MILLIGRAM(S): at 21:00

## 2019-09-13 RX ADMIN — SODIUM CHLORIDE 125 MILLILITER(S): 9 INJECTION, SOLUTION INTRAVENOUS at 20:20

## 2019-09-13 RX ADMIN — Medication 100 MILLIGRAM(S): at 22:10

## 2019-09-13 RX ADMIN — Medication 25 MILLIGRAM(S): at 20:17

## 2019-09-13 RX ADMIN — Medication 975 MILLIGRAM(S): at 02:54

## 2019-09-13 RX ADMIN — Medication 100 MILLIGRAM(S): at 10:06

## 2019-09-13 RX ADMIN — Medication 10 MILLIGRAM(S): at 20:59

## 2019-09-13 RX ADMIN — Medication 975 MILLIGRAM(S): at 11:43

## 2019-09-13 RX ADMIN — Medication 975 MILLIGRAM(S): at 12:27

## 2019-09-13 RX ADMIN — Medication 100 MILLIGRAM(S): at 02:05

## 2019-09-13 NOTE — H&P ADULT - ATTENDING COMMENTS
Attending Note     Patient assessed with resident. Presented with c/o abdominal pain, fevers and vaginal discharge. Patient s/p primary CD on 8/30 for arrest of descent and non-reassuring FHT. She reports currently breastfeeding. Denies drainage from incision.   Denies significant past medical history or medication allergies.   On exam, patient afebrile with mildly elevated WBC   Abdomen: + fundal tenderness, surrounding incisional erythema and edema with minimal induration Attending Note     Patient assessed with resident. Presented with c/o abdominal pain, fevers and vaginal discharge. Patient s/p primary CD on 8/30 for arrest of descent and non-reassuring FHT. She reports currently breastfeeding. Denies drainage from incision.   Denies significant past medical history or medication allergies.   On exam, patient afebrile with mildly elevated WBC   Abdomen: + fundal tenderness, surrounding incisional erythema and edema with minimal induration + tenderness on palpation   no discharge expressed on palpation   Ext: no calf tenderness   Imaging reviewed   Plan for admission for postpartum endometritis and possible cellulitis. Agree with assessment and plan.     VASU Elliott

## 2019-09-13 NOTE — PROGRESS NOTE ADULT - SUBJECTIVE AND OBJECTIVE BOX
Interventional Radiology Brief Post-Procedure Note    Procedure: Abdominal wall fluid collection aspiration    Operators: Trae Butler MD    Anesthesia (type): Provided by anesthesiology.    Contrast: None.     EBL: Minimal.    Findings/Follow up Plan of Care: Successful aspiration of the lower abdominal wall fluid collection with 35 mL serous fluid aspirated. No residual fluid collection. No drain placed.     Specimens Removed: 35 mL serous fluid.     Implants: None.     Complications: No immediate complications.     Condition/Disposition: To recovery area per anesthesiology.     Please call Interventional Radiology x [phone] with any questions, concerns, or issues.

## 2019-09-13 NOTE — H&P ADULT - ASSESSMENT
20 yo  postpartum s/p pLTCS () for arrest of descent, NRFHT presents 2-weeks postpartum with fevers, tachycardia, foul smelling vaginal discharge concerning for postpartum endometritis. CTAP findings of fluid collections along the Pfannenstial incision as other likely source of infection.   - Admit to GYN   - s/p Ceftriaxone x1 in the ED  - Gent/Clinda for presumed endometritis   - IR consult in AM for possible drainage of fluid collections under skin   - LR at 125, strict I&Os  - PO pain management PRN   - Breast pump   - AM CBC with diff     Esra Demirel PGY2  Evaluated w/Dr. Elliott 22 yo  postpartum s/p pLTCS () for arrest of descent, NRFHT presents 2-weeks postpartum with fevers, tachycardia, foul smelling vaginal discharge concerning for postpartum endometritis. CT abdomen/pelvis findings of fluid collections along the Pfannenstiel incision as other likely source of infection.   - Admit to GYN   - s/p Ceftriaxone x1 in the ED  - Gent/Clinda for presumed endometritis   - IR consult in AM for possible drainage of fluid collections under skin   - LR at 125, strict I&Os  - PO pain management PRN   - Breast pump   - AM CBC with diff     Esra Demirel PGY2  Evaluated w/Dr. Elliott

## 2019-09-13 NOTE — H&P ADULT - NSHPREVIEWOFSYSTEMS_GEN_ALL_CORE
GENERAL: +fever, chills  HEENT: no throat pain, cough, congestion, dysphagia  CARDIAC: no chest pain, palpitations  PULM: no shortness of breath, cough, wheezing   GI: +abdominal pain, no nausea, vomiting, diarrhea, constipation  : no dysuria, frequency  NEURO: no headache, lightheadedness  MSK: no joint or muscle pain  SKIN: no rashes  HEME: no active bleeding

## 2019-09-13 NOTE — H&P ADULT - HISTORY OF PRESENT ILLNESS
22 yo  postpartum female s/p pLTCS () for arrest of descent and NRFHT presents with fevers/chills at home with fevers to 102. She also reports foul smelling vaginal discharge and lower abdominal pain. Denies drainage or pain around incision site. She is breastfeeding. Denies breast erythema or tenderness. Denies LE swelling, CP, SOB, lightheadedness, dizziness, dysuria, hematuria. PNC c/b GDMA2 (managed on Metformin).     OBHx: 2xNSVD, pLTCS (19)   GynHx: denies  PMH: denies  SHx: denies  Psych: denies h/o anxiety and depression  Social: denies smoking, alcohol use, and illicit drug use  Meds: Tylenol, Motrin, Oxycodone PRN   All: NKDA 22 yo  postpartum female s/p pLTCS () for arrest of descent and NRFHT presents with fevers/chills at home with fevers to 102. She also reports foul smelling vaginal discharge and lower abdominal pain. Denies drainage or pain around incision site. She is breastfeeding. Denies breast erythema or tenderness. Denies LE swelling, CP, SOB, lightheadedness, dizziness, dysuria, hematuria. PNC c/b GDMA2 (managed on Metformin).     OBHx: 2xNSVD, pLTCS (19)   GynHx: denies  PMH: denies  SHx: primary CD   Psych: denies h/o anxiety and depression  Social: denies smoking, alcohol use, and illicit drug use  Meds: Tylenol, Motrin, Oxycodone PRN   All: NKDA

## 2019-09-13 NOTE — H&P ADULT - NSHPPHYSICALEXAM_GEN_ALL_CORE
Physical Exam:   General: sitting comfortably in bed, NAD   Neck: supple, full ROM, no lymphadenopathy  CV: RRR  Lungs: CTA b/l, good air flow b/l   Back: No CVA tenderness  Abd: Incision clean and intact, mild erythema and induration on right side of incision. Abd soft, nondistended, +fundal tenderness, no rebound or guarding   Pelvic: Foul smelling scant discharge from os, no CMT, +fundal tenderness   Ext: NT b/l, no edema

## 2019-09-13 NOTE — H&P ADULT - NSHPLABSRESULTS_GEN_ALL_CORE
Vital Signs Last 24 Hrs  T(C): 37.3 (12 Sep 2019 21:26), Max: 39.5 (12 Sep 2019 17:34)  T(F): 99.2 (12 Sep 2019 21:26), Max: 103.1 (12 Sep 2019 17:34)  HR: 102 (12 Sep 2019 21:26) (100 - 124)  BP: 99/63 (12 Sep 2019 21:26) (92/62 - 115/77)  BP(mean): --  RR: 20 (12 Sep 2019 21:26) (16 - 20)  SpO2: 100% (12 Sep 2019 21:26) (99% - 100%)    I&O's Detail                            9.6    13.8  )-----------( 661      ( 12 Sep 2019 18:23 )             30.1           138  |  103  |  8   ----------------------------<  89  3.9   |  20<L>  |  0.60    Ca    8.9      12 Sep 2019 20:16  Mg     2.0         TPro  7.6  /  Alb  3.2<L>  /  TBili  0.4  /  DBili  x   /  AST  37  /  ALT  23  /  AlkPhos  138<H>        LIVER FUNCTIONS - ( 12 Sep 2019 18:23 )  Alb: 3.2 g/dL / Pro: 7.6 g/dL / ALK PHOS: 138 U/L / ALT: 23 U/L / AST: 37 U/L / GGT: x           PT/INR - ( 12 Sep 2019 18:23 )   PT: 14.9 sec;   INR: 1.29 ratio    PTT - ( 12 Sep 2019 18:23 )  PTT:27.4 sec    Urinalysis Basic - ( 12 Sep 2019 18:36 )    Color: Yellow / Appearance: Clear / S.027 / pH: x  Gluc: x / Ketone: Negative  / Bili: Negative / Urobili: 2 mg/dL   Blood: x / Protein: 30 mg/dL / Nitrite: Negative   Leuk Esterase: Large / RBC: 8 /hpf / WBC 45 /HPF   Sq Epi: x / Non Sq Epi: 1 /hpf / Bacteria: Negative    EXAM:  CT ABDOMEN AND PELVIS IC                            PROCEDURE DATE:  2019      INTERPRETATION:  CLINICAL INFORMATION: 28-year-old female presenting with   abdominal pain fever status post  section approximately 2 weeks   ago.    COMPARISON: None.    PROCEDURE:   CT of the Abdomen and Pelvis was performed with intravenous contrast.   Intravenous contrast: 90 ml Omnipaque 350. 10 ml discarded.  Oral contrast: None.  Sagittal and coronal reformats were performed.    FINDINGS:  LOWER CHEST: Within normal limits.  LIVER: Hepatomegaly.  BILE DUCTS: Normal caliber.  GALLBLADDER: Within normal limits.  SPLEEN: Within normal limits.  PANCREAS: Within normal limits.  ADRENALS: Within normal limits.  KIDNEYS/URETERS: Within normal limits.    BLADDER: Within normal limits.    REPRODUCTIVE ORGANS/ABDOMINAL WALL: Post-partum uterus with    section defect containing mixed attenuation with droplets of gas, soft   tissue and fluid attenuation. Tiny droplets of gas within the endometrial   cavity. No hematoma or fluid collection along the bladder flap.There are   peripherally enhancing fluid collections along the Pfannenstiel incision,   including a well-defined 6.4 x 1.9 x 2.7 cm left-sided collection and a   ill-defined 4.1 x 2.4 x 6.6 cm right collection with adjacent   inflammatory changes, right greater than left.    BOWEL: No bowel obstruction. Appendix is normal.  PERITONEUM: No ascites.  VESSELS: Within normal limits.  RETROPERITONEUM/LYMPH NODES: No lymphadenopathy.    ABDOMINAL WALL:   BONES: Within normal limits.    IMPRESSION:   Postsurgical changes consistent with recent  section. Mixed gas,   soft tissue and fluid attenuation within the  section defect and   tiny droplets of gas within the endometrial cavity. Fluid collections   along the Pfannenstiel incision. No hematoma or fluid collection along   the bladder flap.    These findings may be within normal limits for recent surgery however   superimposed infection should be excluded based on clinical examination.

## 2019-09-14 DIAGNOSIS — A41.9 SEPSIS, UNSPECIFIED ORGANISM: ICD-10-CM

## 2019-09-14 LAB
BASOPHILS # BLD AUTO: 0 K/UL — SIGNIFICANT CHANGE UP (ref 0–0.2)
BASOPHILS NFR BLD AUTO: 0.1 % — SIGNIFICANT CHANGE UP (ref 0–2)
EOSINOPHIL # BLD AUTO: 0.1 K/UL — SIGNIFICANT CHANGE UP (ref 0–0.5)
EOSINOPHIL NFR BLD AUTO: 0.8 % — SIGNIFICANT CHANGE UP (ref 0–6)
GRAM STN FLD: SIGNIFICANT CHANGE UP
HCT VFR BLD CALC: 26.8 % — LOW (ref 34.5–45)
HGB BLD-MCNC: 8.8 G/DL — LOW (ref 11.5–15.5)
LYMPHOCYTES # BLD AUTO: 1.8 K/UL — SIGNIFICANT CHANGE UP (ref 1–3.3)
LYMPHOCYTES # BLD AUTO: 11.7 % — LOW (ref 13–44)
MCHC RBC-ENTMCNC: 27.8 PG — SIGNIFICANT CHANGE UP (ref 27–34)
MCHC RBC-ENTMCNC: 32.7 GM/DL — SIGNIFICANT CHANGE UP (ref 32–36)
MCV RBC AUTO: 84.8 FL — SIGNIFICANT CHANGE UP (ref 80–100)
MONOCYTES # BLD AUTO: 0.9 K/UL — SIGNIFICANT CHANGE UP (ref 0–0.9)
MONOCYTES NFR BLD AUTO: 5.9 % — SIGNIFICANT CHANGE UP (ref 2–14)
NEUTROPHILS # BLD AUTO: 12.4 K/UL — HIGH (ref 1.8–7.4)
NEUTROPHILS NFR BLD AUTO: 81.4 % — HIGH (ref 43–77)
PLATELET # BLD AUTO: 607 K/UL — HIGH (ref 150–400)
RBC # BLD: 3.16 M/UL — LOW (ref 3.8–5.2)
RBC # FLD: 14.9 % — HIGH (ref 10.3–14.5)
SPECIMEN SOURCE: SIGNIFICANT CHANGE UP
WBC # BLD: 15.2 K/UL — HIGH (ref 3.8–10.5)
WBC # FLD AUTO: 15.2 K/UL — HIGH (ref 3.8–10.5)

## 2019-09-14 RX ORDER — ACETAMINOPHEN 500 MG
1000 TABLET ORAL ONCE
Refills: 0 | Status: COMPLETED | OUTPATIENT
Start: 2019-09-14 | End: 2019-09-14

## 2019-09-14 RX ORDER — IBUPROFEN 200 MG
600 TABLET ORAL EVERY 6 HOURS
Refills: 0 | Status: DISCONTINUED | OUTPATIENT
Start: 2019-09-14 | End: 2019-09-15

## 2019-09-14 RX ADMIN — Medication 300 MILLIGRAM(S): at 03:05

## 2019-09-14 RX ADMIN — Medication 975 MILLIGRAM(S): at 20:19

## 2019-09-14 RX ADMIN — Medication 600 MILLIGRAM(S): at 23:06

## 2019-09-14 RX ADMIN — Medication 100 MILLIGRAM(S): at 13:57

## 2019-09-14 RX ADMIN — HEPARIN SODIUM 5000 UNIT(S): 5000 INJECTION INTRAVENOUS; SUBCUTANEOUS at 17:58

## 2019-09-14 RX ADMIN — Medication 600 MILLIGRAM(S): at 11:05

## 2019-09-14 RX ADMIN — Medication 975 MILLIGRAM(S): at 15:12

## 2019-09-14 RX ADMIN — Medication 600 MILLIGRAM(S): at 12:00

## 2019-09-14 RX ADMIN — Medication 975 MILLIGRAM(S): at 16:00

## 2019-09-14 RX ADMIN — Medication 600 MILLIGRAM(S): at 18:30

## 2019-09-14 RX ADMIN — HEPARIN SODIUM 5000 UNIT(S): 5000 INJECTION INTRAVENOUS; SUBCUTANEOUS at 06:20

## 2019-09-14 RX ADMIN — Medication 1000 MILLIGRAM(S): at 05:30

## 2019-09-14 RX ADMIN — Medication 600 MILLIGRAM(S): at 17:54

## 2019-09-14 RX ADMIN — Medication 600 MILLIGRAM(S): at 23:53

## 2019-09-14 RX ADMIN — Medication 400 MILLIGRAM(S): at 04:56

## 2019-09-14 RX ADMIN — Medication 975 MILLIGRAM(S): at 20:30

## 2019-09-14 RX ADMIN — Medication 100 MILLIGRAM(S): at 06:19

## 2019-09-14 RX ADMIN — Medication 100 MILLIGRAM(S): at 22:02

## 2019-09-14 NOTE — PROGRESS NOTE ADULT - SUBJECTIVE AND OBJECTIVE BOX
R3 OB Note   POD#15, HD#3     Interval Events: S/p IR drainage of seroma with 35cc of serous fluid expressed. Febrile this AM to 38.6.     Patient seen and examined at bedside. Reports mild HA. Denies any blurry vision, lightheadedness, CP/SOB, N/V. Patient is ambulating, voiding spontaneously, passing flatus, and tolerating regular diet.    Vital Signs Last 24 Hours  T(C): 38.6 (09-14-19 @ 04:45), Max: 38.6 (09-14-19 @ 04:45)  HR: 98 (09-14-19 @ 04:45) (85 - 101)  BP: 113/74 (09-14-19 @ 04:45) (98/66 - 123/58)  RR: 18 (09-14-19 @ 04:45) (14 - 18)  SpO2: 96% (09-14-19 @ 04:45) (96% - 100%)    Physical Exam:  General: NAD  CV: RRR  Pulm: CTAB  Abdomen: Soft, moderately tender andres-incisional, non-distended  Incision: Pfannenstiel incision CDI; left aspect of incision drainage site C/D/I  Pelvic: Lochia wnl; fundus firm and non-tender   Extremities: no calf tenderness noted on exam    Labs:    Blood Type: O Negative  Antibody Screen: Negative  RPR: Negative               8.8    15.2  )-----------( 607      ( 09-14 @ 06:01 )             26.8                8.5    12.3  )-----------( 552      ( 09-13 @ 07:34 )             26.3                9.6    13.8  )-----------( 661      ( 09-12 @ 18:23 )             30.1         MEDICATIONS  (STANDING):  clindamycin IVPB 900 milliGRAM(s) IV Intermittent every 8 hours  clindamycin IVPB      gentamicin   IVPB 500 milliGRAM(s) IV Intermittent every 24 hours  heparin  Injectable 5000 Unit(s) SubCutaneous every 12 hours  lactated ringers. 1000 milliLiter(s) (125 mL/Hr) IV Continuous <Continuous>    MEDICATIONS  (PRN):  acetaminophen   Tablet .. 975 milliGRAM(s) Oral every 6 hours PRN Moderate Pain (4 - 6)

## 2019-09-14 NOTE — PROGRESS NOTE ADULT - ASSESSMENT
20yo  postpartum s/p pLTCS () for arrest of descent, NRFHT presents 2-weeks postpartum with fevers, tachycardia, foul smelling vaginal discharge concerning for postpartum endometritis. CTAP findings of fluid collections along the Pfannenstial incision as other likely source of infection, now s/p IR drainage of 35cc serous fluid with temp to 38.6 this AM, in stable condition.

## 2019-09-14 NOTE — PROGRESS NOTE ADULT - PROBLEM SELECTOR PLAN 1
Neuro: c/w IV tylenol   CV: Hemodynamically stable.   ID: Continues on gentamicin and clindamycin. Febrile to 38.4 likely secondary to inflammatory reaction s/p IR drainage, will continue to monitor. Blood and urine cultures pending.   Pulm: Saturating well on room air, encourage incentive spirometry  GI: c/w regular diet  : voiding spontaneously  Heme: c/w HSQ and SCDs for DVT ppx  Dispo: Continue routine post-op care    MICHAEL Palacio, PGY3

## 2019-09-15 LAB
BASOPHILS # BLD AUTO: 0 K/UL — SIGNIFICANT CHANGE UP (ref 0–0.2)
BASOPHILS NFR BLD AUTO: 0.1 % — SIGNIFICANT CHANGE UP (ref 0–2)
EOSINOPHIL # BLD AUTO: 0.2 K/UL — SIGNIFICANT CHANGE UP (ref 0–0.5)
EOSINOPHIL NFR BLD AUTO: 1 % — SIGNIFICANT CHANGE UP (ref 0–6)
HCT VFR BLD CALC: 28.1 % — LOW (ref 34.5–45)
HGB BLD-MCNC: 8.9 G/DL — LOW (ref 11.5–15.5)
LYMPHOCYTES # BLD AUTO: 1.5 K/UL — SIGNIFICANT CHANGE UP (ref 1–3.3)
LYMPHOCYTES # BLD AUTO: 9.5 % — LOW (ref 13–44)
MCHC RBC-ENTMCNC: 27.1 PG — SIGNIFICANT CHANGE UP (ref 27–34)
MCHC RBC-ENTMCNC: 31.6 GM/DL — LOW (ref 32–36)
MCV RBC AUTO: 85.7 FL — SIGNIFICANT CHANGE UP (ref 80–100)
MONOCYTES # BLD AUTO: 0.7 K/UL — SIGNIFICANT CHANGE UP (ref 0–0.9)
MONOCYTES NFR BLD AUTO: 4.8 % — SIGNIFICANT CHANGE UP (ref 2–14)
NEUTROPHILS # BLD AUTO: 13.1 K/UL — HIGH (ref 1.8–7.4)
NEUTROPHILS NFR BLD AUTO: 84.7 % — HIGH (ref 43–77)
PLATELET # BLD AUTO: 637 K/UL — HIGH (ref 150–400)
RBC # BLD: 3.27 M/UL — LOW (ref 3.8–5.2)
RBC # FLD: 14.8 % — HIGH (ref 10.3–14.5)
WBC # BLD: 15.5 K/UL — HIGH (ref 3.8–10.5)
WBC # FLD AUTO: 15.5 K/UL — HIGH (ref 3.8–10.5)

## 2019-09-15 PROCEDURE — 99223 1ST HOSP IP/OBS HIGH 75: CPT | Mod: GC

## 2019-09-15 RX ORDER — SODIUM CHLORIDE 9 MG/ML
1000 INJECTION, SOLUTION INTRAVENOUS
Refills: 0 | Status: DISCONTINUED | OUTPATIENT
Start: 2019-09-15 | End: 2019-09-19

## 2019-09-15 RX ORDER — PIPERACILLIN AND TAZOBACTAM 4; .5 G/20ML; G/20ML
3.38 INJECTION, POWDER, LYOPHILIZED, FOR SOLUTION INTRAVENOUS ONCE
Refills: 0 | Status: COMPLETED | OUTPATIENT
Start: 2019-09-15 | End: 2019-09-15

## 2019-09-15 RX ORDER — PIPERACILLIN AND TAZOBACTAM 4; .5 G/20ML; G/20ML
3.38 INJECTION, POWDER, LYOPHILIZED, FOR SOLUTION INTRAVENOUS EVERY 8 HOURS
Refills: 0 | Status: COMPLETED | OUTPATIENT
Start: 2019-09-15 | End: 2019-09-18

## 2019-09-15 RX ADMIN — Medication 600 MILLIGRAM(S): at 14:52

## 2019-09-15 RX ADMIN — Medication 600 MILLIGRAM(S): at 05:41

## 2019-09-15 RX ADMIN — Medication 300 MILLIGRAM(S): at 02:33

## 2019-09-15 RX ADMIN — Medication 600 MILLIGRAM(S): at 06:43

## 2019-09-15 RX ADMIN — HEPARIN SODIUM 5000 UNIT(S): 5000 INJECTION INTRAVENOUS; SUBCUTANEOUS at 18:09

## 2019-09-15 RX ADMIN — Medication 975 MILLIGRAM(S): at 08:47

## 2019-09-15 RX ADMIN — Medication 975 MILLIGRAM(S): at 18:14

## 2019-09-15 RX ADMIN — Medication 975 MILLIGRAM(S): at 11:39

## 2019-09-15 RX ADMIN — PIPERACILLIN AND TAZOBACTAM 25 GRAM(S): 4; .5 INJECTION, POWDER, LYOPHILIZED, FOR SOLUTION INTRAVENOUS at 21:39

## 2019-09-15 RX ADMIN — Medication 100 MILLIGRAM(S): at 14:52

## 2019-09-15 RX ADMIN — Medication 975 MILLIGRAM(S): at 02:34

## 2019-09-15 RX ADMIN — Medication 600 MILLIGRAM(S): at 18:14

## 2019-09-15 RX ADMIN — Medication 975 MILLIGRAM(S): at 21:38

## 2019-09-15 RX ADMIN — Medication 975 MILLIGRAM(S): at 04:53

## 2019-09-15 RX ADMIN — Medication 600 MILLIGRAM(S): at 11:36

## 2019-09-15 RX ADMIN — Medication 600 MILLIGRAM(S): at 18:10

## 2019-09-15 RX ADMIN — Medication 975 MILLIGRAM(S): at 22:41

## 2019-09-15 RX ADMIN — Medication 975 MILLIGRAM(S): at 14:51

## 2019-09-15 RX ADMIN — HEPARIN SODIUM 5000 UNIT(S): 5000 INJECTION INTRAVENOUS; SUBCUTANEOUS at 05:41

## 2019-09-15 RX ADMIN — Medication 100 MILLIGRAM(S): at 05:41

## 2019-09-15 RX ADMIN — SODIUM CHLORIDE 125 MILLILITER(S): 9 INJECTION, SOLUTION INTRAVENOUS at 18:14

## 2019-09-15 RX ADMIN — PIPERACILLIN AND TAZOBACTAM 200 GRAM(S): 4; .5 INJECTION, POWDER, LYOPHILIZED, FOR SOLUTION INTRAVENOUS at 18:09

## 2019-09-15 NOTE — CONSULT NOTE ADULT - SUBJECTIVE AND OBJECTIVE BOX
HPI: 22 yo  postpartum female s/p pLTCS () for arrest of descent and NRFHT presents with fevers/chills at home with fevers to 102. Had fevers 2-3 days prior to admission. Didn't have abd discomfort at the time but noted to have some RLQ abd discomfort today. No n/v. Didn't notice discharge, malodor from surgical site. Had bloody vaginal discharge- no particular malodor. Denies cough, rhinorrhea, sore throat, cp, dysuria. Wants to breastfeed. Baby doing well, not sick. Both mom and baby had normal hospital course post .     Per OB-GYN note- had fundal tenderness on examination.    Had ct a/p showing two abd fluid collections. Per pt, IR drained collection on left side. Pt c/o discomfort on RLQ.     PAST MEDICAL & SURGICAL HISTORY:  Gestational diabetes mellitus treated with oral hypoglycemic therapy  H/O:       Allergies    No Known Allergies    Intolerances        ANTIMICROBIALS:  clindamycin IVPB 900 every 8 hours  clindamycin IVPB    gentamicin   IVPB 500 every 24 hours      OTHER MEDS:  acetaminophen   Tablet .. 975 milliGRAM(s) Oral every 6 hours PRN  heparin  Injectable 5000 Unit(s) SubCutaneous every 12 hours  ibuprofen  Tablet. 600 milliGRAM(s) Oral every 6 hours PRN      SOCIAL HISTORY:  Substance Use (street drugs): denies  Tobacco Usage:  denies  Alcohol Usage: denies    FAMILY HISTORY:  No family hx DM    ROS:  All other systems negative     Constitutional: fever, chills  Eye: no eye pain, no redness, no vision changes  ENT:  no sore throat, no rhinorrhea  Cardiovascular:  no chest pain, no palpitation  Respiratory:  no SOB, no cough  GI:  RLQ abd pain, no vomiting, no diarrhea  urinary: no dysuria  : vaginal bleeding  musculoskeletal:  no joint pain, no joint swelling  skin:  no rash  neurology:  no headache    Physical Exam:    General:    NAD, non toxic  Head: atraumatic, normocephalic  Eyes: normal sclera and conjunctiva  ENT:   neck supple  Cardio:    regular S1,S2  Respiratory:   clear b/l, no wheezing  abd:   soft, BS +, area of induration felt in RLQ  :    no mcgill  Musculoskeletal : no joint swelling, no edema  Skin:    some erythema surrounding surgical incision site  Neurologic:     no focal deficits  psych: normal affect      Drug Dosing Weight  Height (cm): 157.48 (12 Sep 2019 16:35)  Weight (kg): 103.4 (12 Sep 2019 16:35)  BMI (kg/m2): 41.7 (12 Sep 2019 16:35)  BSA (m2): 2.02 (12 Sep 2019 16:35)    Vital Signs Last 24 Hrs  T(F): 99 (09-15-19 @ 12:54), Max: 103.1 (19 @ 17:34)    Vital Signs Last 24 Hrs  HR: 92 (09-15-19 @ 12:54) (84 - 98)  BP: 95/65 (09-15-19 @ 12:54) (92/62 - 132/78)  RR: 18 (09-15-19 @ 12:54)  SpO2: 99% (09-15-19 @ 12:54) (97% - 99%)  Wt(kg): --                          8.9    15.5  )-----------( 637      ( 15 Sep 2019 07:05 )             28.1                   MICROBIOLOGY:    Abdominal Fl abdominal wall fluid collectio  19   No growth to date.  --    polymorphonuclear leukocytes seen  No organisms seen  by cytocentrifuge      .Urine  19   <10,000 CFU/mL Normal Urogenital Suzanne  --  --      .Blood  19   No growth to date.  --  --    RADIOLOGY:  CXR- Clear lungs.    CT AP- Postsurgical changes consistent with recent  section. Mixed gas,   soft tissue and fluid attenuation within the  section defect and   tiny droplets of gas within the endometrial cavity. Fluid collections   along the Pfannenstiel incision. No hematoma or fluid collection along   the bladder flap.  These findings may be within normal limits for recent surgery however   superimposed infection should be excluded based on clinical examination. HPI: 20 yo  postpartum female s/p pLTCS () for arrest of descent and NRFHT presents with fevers/chills at home with fevers to 102. Had fevers 2-3 days prior to admission. Didn't have abd discomfort at the time but noted to have some RLQ abd discomfort today. No n/v. Didn't notice discharge, malodor from surgical site. Had bloody vaginal discharge- no particular malodor. Denies cough, rhinorrhea, sore throat, cp, dysuria. Wants to breastfeed. Baby doing well, not sick. Both mom and baby had normal hospital course post .     Per OB-GYN note- had fundal tenderness on examination.  Had ct a/p showing two abd fluid collections. Per pt, IR drained collection on left side. Pt c/o discomfort on RLQ. No N/V/D. No abnormal vaginal discharge.       PAST MEDICAL & SURGICAL HISTORY:  Gestational diabetes mellitus treated with oral hypoglycemic therapy  H/O:       Allergies  No Known Allergies      ANTIMICROBIALS:  clindamycin IVPB 900 every 8 hours  clindamycin IVPB    gentamicin   IVPB 500 every 24 hours      OTHER MEDS:  acetaminophen   Tablet .. 975 milliGRAM(s) Oral every 6 hours PRN  heparin  Injectable 5000 Unit(s) SubCutaneous every 12 hours  ibuprofen  Tablet. 600 milliGRAM(s) Oral every 6 hours PRN      SOCIAL HISTORY:  , live with family.   Substance Use (street drugs): denies  Tobacco Usage:  denies  Alcohol Usage: denies      FAMILY HISTORY:  No family hx DM      ROS:  All other systems negative     Constitutional: fever, chills  Eye: no eye pain, no redness, no vision changes  ENT:  no sore throat, no rhinorrhea  Cardiovascular:  no chest pain, no palpitation  Respiratory:  no SOB, no cough  GI:  RLQ abd pain, no vomiting, no diarrhea  urinary: no dysuria  : vaginal bleeding  musculoskeletal:  no joint pain, no joint swelling  skin:  no rash  neurology:  no headache  extremities: no edema.       Physical Exam:  General:    NAD, non toxic  Head: atraumatic, normocephalic  Eyes: normal sclera and conjunctiva  ENT:   neck supple  Cardio:    regular S1,S2, tachy   Respiratory:   clear b/l, no wheezing  abd:   soft, BS +, area of induration felt in RLQ  :    no mcgill  Musculoskeletal : no joint swelling, no edema  Skin: + warmth, erythema, tenderness surrounding surgical incision site  Neurologic:     no focal deficits  psych: normal affect      Drug Dosing Weight  Height (cm): 157.48 (12 Sep 2019 16:35)  Weight (kg): 103.4 (12 Sep 2019 16:35)  BMI (kg/m2): 41.7 (12 Sep 2019 16:35)  BSA (m2): 2.02 (12 Sep 2019 16:35)    Vital Signs Last 24 Hrs  T(F): 99 (09-15-19 @ 12:54), Max: 103.1 (19 @ 17:34)    Vital Signs Last 24 Hrs  HR: 92 (09-15-19 @ 12:54) (84 - 98)  BP: 95/65 (09-15-19 @ 12:54) (92/62 - 132/78)  RR: 18 (09-15-19 @ 12:54)  SpO2: 99% (09-15-19 @ 12:54) (97% - 99%)  Wt(kg): --                          8.9    15.5  )-----------( 637      ( 15 Sep 2019 07:05 )             28.1         MICROBIOLOGY:    Abdominal Fl abdominal wall fluid collectio  19   No growth to date.  --    polymorphonuclear leukocytes seen  No organisms seen  by cytocentrifuge      .Urine  19   <10,000 CFU/mL Normal Urogenital Suzanne  --  --      .Blood  19   No growth to date.  --  --    RADIOLOGY:  CXR- Clear lungs.    CT AP- Postsurgical changes consistent with recent  section. Mixed gas,   soft tissue and fluid attenuation within the  section defect and   tiny droplets of gas within the endometrial cavity. Fluid collections   along the Pfannenstiel incision. No hematoma or fluid collection along   the bladder flap.  These findings may be within normal limits for recent surgery however   superimposed infection should be excluded based on clinical examination.

## 2019-09-15 NOTE — PROGRESS NOTE ADULT - SUBJECTIVE AND OBJECTIVE BOX
R3 Postpartum Note-HD#4, POD#16    S: Patient seen and examined at bedside, no acute overnight events. No acute complaints.     Denies fevers, chills, nausea, vomiting, SOB, CP, urinary issues.  Is ambulating, tolerating PO, pain is well controlled.    O:   Vital Signs Last 24 Hours  T(C): 37.3 (09-15-19 @ 04:21), Max: 38.6 (09-14-19 @ 04:45)  HR: 91 (09-15-19 @ 04:21) (84 - 98)  BP: 95/66 (09-15-19 @ 04:21) (95/66 - 132/78)  RR: 18 (09-15-19 @ 04:21) (18 - 20)  SpO2: 98% (09-15-19 @ 04:21) (96% - 98%)        Physical Exam:  Cardio: nl S1/S2, RRR  Pulm: CTABL  General: NAD  Abdomen: Soft, non-tender, uterus firm, non tender  Incision: Pfannenstiel incision, clean dry and intact  Ext: No pain or swelling    Labs:             8.8    15.2  )-----------( 607      ( 09-14 @ 06:01 )             26.8       PT/INR - ( 09-12 @ 18:23 )   PT: 14.9 sec;   INR: 1.29 ratio    PTT - ( 09-12 @ 18:23 )  PTT:27.4 sec      MEDICATIONS  (STANDING):  clindamycin IVPB 900 milliGRAM(s) IV Intermittent every 8 hours  clindamycin IVPB      gentamicin   IVPB 500 milliGRAM(s) IV Intermittent every 24 hours  heparin  Injectable 5000 Unit(s) SubCutaneous every 12 hours    MEDICATIONS  (PRN):  acetaminophen   Tablet .. 975 milliGRAM(s) Oral every 6 hours PRN Moderate Pain (4 - 6)  ibuprofen  Tablet. 600 milliGRAM(s) Oral every 6 hours PRN Mild Pain (1 - 3) R3 Postpartum Note-HD#4, POD#16    S: Patient seen and examined at bedside, no acute overnight events. No acute complaints.     Denies fevers, chills, nausea, vomiting, SOB, CP, urinary issues.  Is ambulating, tolerating PO, pain is well controlled.    O:   Vital Signs Last 24 Hours  T(C): 37.3 (09-15-19 @ 04:21), Max: 38.6 (09-14-19 @ 04:45)  HR: 91 (09-15-19 @ 04:21) (84 - 98)  BP: 95/66 (09-15-19 @ 04:21) (95/66 - 132/78)  RR: 18 (09-15-19 @ 04:21) (18 - 20)  SpO2: 98% (09-15-19 @ 04:21) (96% - 98%)        Physical Exam:  Cardio: nl S1/S2, RRR  Pulm: CTABL  General: NAD  Abdomen: Soft, non-tender, uterus firm, non tender  Incision: Pfannenstiel incision, clean dry and intact; erythematous and hard on right aspect of incision extending to lateral   Ext: No pain or swelling    Labs:             8.8    15.2  )-----------( 607      ( 09-14 @ 06:01 )             26.8       PT/INR - ( 09-12 @ 18:23 )   PT: 14.9 sec;   INR: 1.29 ratio    PTT - ( 09-12 @ 18:23 )  PTT:27.4 sec      MEDICATIONS  (STANDING):  clindamycin IVPB 900 milliGRAM(s) IV Intermittent every 8 hours  clindamycin IVPB      gentamicin   IVPB 500 milliGRAM(s) IV Intermittent every 24 hours  heparin  Injectable 5000 Unit(s) SubCutaneous every 12 hours    MEDICATIONS  (PRN):  acetaminophen   Tablet .. 975 milliGRAM(s) Oral every 6 hours PRN Moderate Pain (4 - 6)  ibuprofen  Tablet. 600 milliGRAM(s) Oral every 6 hours PRN Mild Pain (1 - 3)

## 2019-09-15 NOTE — CONSULT NOTE ADULT - ASSESSMENT
22 yo  postpartum female s/p pLTCS () for arrest of descent and NRFHT presents with fevers/chills at home with fevers to 102.    full consult pending 20 yo  postpartum female s/p pLTCS () for arrest of descent and NRFHT presents with fevers/chills at home with fevers to 102, found to have abd fluid collections (post-op vs abscess), cellulitis of SSI and possible endometritis.     - d/c clindamycin and gentamicin  - start zosyn 3.375g IV q8h  - follow up abd fluid cultures  - primary team to ask IR if any possibility for drainage of R abd fluid collection as the pt is experiencing more pain there  - follow up BCx- neg to date  - monitor for fevers  - monitor cbc w diff    d/w primary team 20 yo  postpartum female s/p pLTCS () for arrest of descent and NRFHT presents with fevers/chills at home with fevers to 102, found to have abd fluid collections (post-op vs abscess), cellulitis of SSI and possible endometritis.     Overall sepsis, fever, leucocytosis tachycardia, abdominal wall cellulitis and abscess.    Plan:   - d/c clindamycin and gentamicin  - start zosyn 3.375g IV q8h  - follow up abd fluid cultures  - reconsult IR if any possibility for drainage of Rt abd fluid collection as the pt is experiencing more pain and induration there  - follow up BCx- neg to date  - monitor for fevers  - monitor cbc w diff    d/w primary team

## 2019-09-15 NOTE — PROGRESS NOTE ADULT - ASSESSMENT
A/P: 29yo s/p pLTCS 8/30, a/w postpartum endometritis on 9/12, now s/p IR aspiration 9/13 (35cc serous fluid), HD#4, POD#16, stable.  Neuro: c/w IV tylenol   CV: Hemodynamically stable.   ID: C/w gentamicin and clindamycin (started 9/12). Afebrile overnight, prelim blood culture (9/12) NGTD, urine culture (9/12) w < 10,000 CFU; culture from IR drain (9/13) NGTD, f/u final; continue to trend WBC, f/u AM labs, continue to trend fever curve  Pulm: Saturating well on room air, encourage incentive spirometry  GI: c/w regular diet  : voiding spontaneously  Heme: c/w HSQ and SCDs for DVT ppx  Dispo: Continue routine post-op care    PHILIPPE Pedraza PGY3

## 2019-09-16 LAB
APTT BLD: 30.9 SEC — SIGNIFICANT CHANGE UP (ref 27.5–36.3)
BASOPHILS # BLD AUTO: 0 K/UL — SIGNIFICANT CHANGE UP (ref 0–0.2)
EOSINOPHIL # BLD AUTO: 0.1 K/UL — SIGNIFICANT CHANGE UP (ref 0–0.5)
HCT VFR BLD CALC: 28 % — LOW (ref 34.5–45)
HGB BLD-MCNC: 8.6 G/DL — LOW (ref 11.5–15.5)
INR BLD: 1.42 RATIO — HIGH (ref 0.88–1.16)
LYMPHOCYTES # BLD AUTO: 1.4 K/UL — SIGNIFICANT CHANGE UP (ref 1–3.3)
LYMPHOCYTES # BLD AUTO: 8 % — LOW (ref 13–44)
MCHC RBC-ENTMCNC: 26.1 PG — LOW (ref 27–34)
MCHC RBC-ENTMCNC: 30.7 GM/DL — LOW (ref 32–36)
MCV RBC AUTO: 85 FL — SIGNIFICANT CHANGE UP (ref 80–100)
MONOCYTES # BLD AUTO: 0.9 K/UL — SIGNIFICANT CHANGE UP (ref 0–0.9)
MONOCYTES NFR BLD AUTO: 1 % — LOW (ref 2–14)
NEUTROPHILS # BLD AUTO: 14.5 K/UL — HIGH (ref 1.8–7.4)
NEUTROPHILS NFR BLD AUTO: 88 % — HIGH (ref 43–77)
PLATELET # BLD AUTO: 655 K/UL — HIGH (ref 150–400)
PROTHROM AB SERPL-ACNC: 16.4 SEC — HIGH (ref 10–12.9)
RBC # BLD: 3.3 M/UL — LOW (ref 3.8–5.2)
RBC # FLD: 15.2 % — HIGH (ref 10.3–14.5)
WBC # BLD: 16.9 K/UL — HIGH (ref 3.8–10.5)
WBC # FLD AUTO: 16.9 K/UL — HIGH (ref 3.8–10.5)

## 2019-09-16 PROCEDURE — 74176 CT ABD & PELVIS W/O CONTRAST: CPT | Mod: 26

## 2019-09-16 PROCEDURE — 99233 SBSQ HOSP IP/OBS HIGH 50: CPT

## 2019-09-16 RX ORDER — VANCOMYCIN HCL 1 G
1000 VIAL (EA) INTRAVENOUS EVERY 12 HOURS
Refills: 0 | Status: DISCONTINUED | OUTPATIENT
Start: 2019-09-16 | End: 2019-09-18

## 2019-09-16 RX ORDER — OXYCODONE HYDROCHLORIDE 5 MG/1
5 TABLET ORAL EVERY 4 HOURS
Refills: 0 | Status: DISCONTINUED | OUTPATIENT
Start: 2019-09-16 | End: 2019-09-20

## 2019-09-16 RX ADMIN — OXYCODONE HYDROCHLORIDE 5 MILLIGRAM(S): 5 TABLET ORAL at 14:44

## 2019-09-16 RX ADMIN — Medication 250 MILLIGRAM(S): at 13:22

## 2019-09-16 RX ADMIN — OXYCODONE HYDROCHLORIDE 5 MILLIGRAM(S): 5 TABLET ORAL at 10:35

## 2019-09-16 RX ADMIN — OXYCODONE HYDROCHLORIDE 5 MILLIGRAM(S): 5 TABLET ORAL at 15:20

## 2019-09-16 RX ADMIN — Medication 975 MILLIGRAM(S): at 16:00

## 2019-09-16 RX ADMIN — Medication 975 MILLIGRAM(S): at 09:25

## 2019-09-16 RX ADMIN — OXYCODONE HYDROCHLORIDE 5 MILLIGRAM(S): 5 TABLET ORAL at 21:25

## 2019-09-16 RX ADMIN — PIPERACILLIN AND TAZOBACTAM 25 GRAM(S): 4; .5 INJECTION, POWDER, LYOPHILIZED, FOR SOLUTION INTRAVENOUS at 05:51

## 2019-09-16 RX ADMIN — Medication 975 MILLIGRAM(S): at 21:16

## 2019-09-16 RX ADMIN — PIPERACILLIN AND TAZOBACTAM 25 GRAM(S): 4; .5 INJECTION, POWDER, LYOPHILIZED, FOR SOLUTION INTRAVENOUS at 16:25

## 2019-09-16 RX ADMIN — Medication 975 MILLIGRAM(S): at 09:33

## 2019-09-16 RX ADMIN — Medication 975 MILLIGRAM(S): at 03:12

## 2019-09-16 RX ADMIN — Medication 975 MILLIGRAM(S): at 15:27

## 2019-09-16 RX ADMIN — OXYCODONE HYDROCHLORIDE 5 MILLIGRAM(S): 5 TABLET ORAL at 10:03

## 2019-09-16 RX ADMIN — Medication 975 MILLIGRAM(S): at 04:15

## 2019-09-16 RX ADMIN — Medication 975 MILLIGRAM(S): at 21:46

## 2019-09-16 RX ADMIN — OXYCODONE HYDROCHLORIDE 5 MILLIGRAM(S): 5 TABLET ORAL at 20:55

## 2019-09-16 NOTE — PROGRESS NOTE ADULT - SUBJECTIVE AND OBJECTIVE BOX
R3 OB Note   HD#5, POD#17     S: 27yo  POD#17 s/ pLTCS now HD#5 a/w postpartum endometritis and seroma s/p IR aspiration on . Patient seen and examined at bedside, no acute overnight events. No acute complaints. Reports continued incisional discomfort on right aspect of incision. Denies any HA, blurry vision, lightheadedness, CP/SOB, N/V. Patient is ambulating, voiding spontaneously, passing flatus. Has been NPO since midnight for possible IR drainage today.     Vital Signs Last 24 Hours  T(C): 36.9 (19 @ 01:07), Max: 37.8 (09-15-19 @ 18:05)  HR: 89 (19 @ 01:07) (88 - 103)  BP: 90/63 (19 @ 01:07) (90/63 - 102/70)  RR: 18 (19 @ 01:07) (18 - 18)  SpO2: 97% (19 @ 01:07) (97% - 99%)    Physical Exam:  General: NAD  CV: RRR  Pulm: CTAB  Abdomen: Soft, non-tender, non-distended  Incision: Pfannenstiel incision CDI, erythematous on right aspect of incision with fullness palpated   Extremities: no calf tenderness noted on exam    Labs:    Blood Type: O Negative  Antibody Screen: Negative  RPR: Negative               8.9    15.5  )-----------( 637      ( 09-15 @ 07:05 )             28.1                8.8    15.2  )-----------( 607      (  @ 06:01 )             26.8                8.5    12.3  )-----------( 552      (  @ 07:34 )             26.3         MEDICATIONS  (STANDING):  lactated ringers. 1000 milliLiter(s) (125 mL/Hr) IV Continuous <Continuous>  piperacillin/tazobactam IVPB.. 3.375 Gram(s) IV Intermittent every 8 hours    MEDICATIONS  (PRN):  acetaminophen   Tablet .. 975 milliGRAM(s) Oral every 6 hours PRN Moderate Pain (4 - 6) R3 OB Note   HD#5, POD#17     S: 29yo  POD#17 s/ pLTCS now HD#5 a/w postpartum endometritis and seroma s/p IR aspiration on . Patient seen and examined at bedside, no acute overnight events. States she has increased discomfort on right aspectr of incision with burning.  Denies any HA, blurry vision, lightheadedness, CP/SOB, N/V. Patient is ambulating, voiding spontaneously, passing flatus. Has been NPO since midnight for possible IR drainage today.     Vital Signs Last 24 Hours  T(C): 36.9 (19 @ 01:07), Max: 37.8 (09-15-19 @ 18:05)  HR: 89 (19 @ 01:07) (88 - 103)  BP: 90/63 (19 @ 01:07) (90/63 - 102/70)  RR: 18 (19 @ 01:07) (18 - 18)  SpO2: 97% (19 @ 01:07) (97% - 99%)    Physical Exam:  General: NAD  CV: RRR  Pulm: CTAB  Abdomen: Soft, tender on right aspect of incision, non-distended  Incision: Pfannenstiel incision CDI, erythematous on right aspect of incision with fullness palpated and tender   Extremities: no calf tenderness noted on exam    Labs:    Blood Type: O Negative  Antibody Screen: Negative  RPR: Negative               8.9    15.5  )-----------( 637      ( 09-15 @ 07:05 )             28.1                8.8    15.2  )-----------( 607      (  @ 06:01 )             26.8                8.5    12.3  )-----------( 552      (  @ 07:34 )             26.3         MEDICATIONS  (STANDING):  lactated ringers. 1000 milliLiter(s) (125 mL/Hr) IV Continuous <Continuous>  piperacillin/tazobactam IVPB.. 3.375 Gram(s) IV Intermittent every 8 hours    MEDICATIONS  (PRN):	  acetaminophen   Tablet .. 975 milliGRAM(s) Oral every 6 hours PRN Moderate Pain (4 - 6) R3 OB Note   HD#5, POD#17     S: 27yo  POD#17 s/ pLTCS now HD#5 a/w postpartum endometritis and seroma s/p IR aspiration on . Patient seen and examined at bedside. Noted to be febrile to 38.3 this AM at 0547. States she has increased discomfort on right aspectr of incision with burning.  Denies any HA, blurry vision, lightheadedness, CP/SOB, N/V. Patient is ambulating, voiding spontaneously, passing flatus. Has been NPO since midnight for possible IR drainage today.     Vital Signs Last 24 Hours  T(C): 36.9 (19 @ 01:07), Max: 37.8 (09-15-19 @ 18:05)  HR: 89 (19 @ 01:07) (88 - 103)  BP: 90/63 (19 @ 01:07) (90/63 - 102/70)  RR: 18 (19 @ 01:07) (18 - 18)  SpO2: 97% (19 @ 01:07) (97% - 99%)    Physical Exam:  General: NAD  CV: RRR  Pulm: CTAB  Abdomen: Soft, tender on right aspect of incision, non-distended  Incision: Pfannenstiel incision CDI, erythematous on right aspect of incision with fullness palpated and tender   Extremities: no calf tenderness noted on exam    Labs:    Blood Type: O Negative  Antibody Screen: Negative  RPR: Negative               8.9    15.5  )-----------( 637      ( 09-15 @ 07:05 )             28.1                8.8    15.2  )-----------( 607      (  @ 06:01 )             26.8                8.5    12.3  )-----------( 552      (  @ 07:34 )             26.3         MEDICATIONS  (STANDING):  lactated ringers. 1000 milliLiter(s) (125 mL/Hr) IV Continuous <Continuous>  piperacillin/tazobactam IVPB.. 3.375 Gram(s) IV Intermittent every 8 hours    MEDICATIONS  (PRN):	  acetaminophen   Tablet .. 975 milliGRAM(s) Oral every 6 hours PRN Moderate Pain (4 - 6)

## 2019-09-16 NOTE — PROGRESS NOTE ADULT - SUBJECTIVE AND OBJECTIVE BOX
Vascular & Interventional Radiology Pre-Procedure Note    Procedure Name: Abdominal wall fluid collection, possible drain placement    HPI: 28y Female with abdominal wall fluid collection s/p , referred from aspiration, possible drain placement. Previous aspiration yielded serous fluid    Allergies:   Medications:    clindamycin IVPB: 100 mL/Hr IV Intermittent (09-15 @ 14:52)  gentamicin   IVPB: 300 mL/Hr IV Intermittent (09-15 @ 02:33)  heparin  Injectable: 5000 Unit(s) SubCutaneous (09-15 @ 18:09)  piperacillin/tazobactam IVPB.: 200 mL/Hr IV Intermittent (09-15 @ 18:09)  piperacillin/tazobactam IVPB..: 25 mL/Hr IV Intermittent ( @ 16:25)  vancomycin  IVPB: 250 mL/Hr IV Intermittent ( @ 13:22)    Data:    T(C): 37.6  HR: 104  BP: 100/68  RR: 18  SpO2: 96%    -WBC 16.9 / HgB 8.6 / Hct 28.0 / Plt 655  -Na 138 / Cl 103 / BUN 8 / Glucose 89  -K 3.9 / CO2 20 / Cr 0.60  -ALT -- / Alk Phos -- / T.Bili --  -INR1.42    Imaging: reviewed    Plan:   -28y Female presents for abdominal wall fluid collection aspiration, possible drain placement.  -Risks/Benefits/alternatives explained with the patient and witnessed informed consent obtained.

## 2019-09-16 NOTE — PROGRESS NOTE ADULT - ASSESSMENT
A/P: 29yo s/p pLTCS 8/30, a/w postpartum endometritis on 9/12, now s/p IR aspiration 9/13 (35cc serous fluid), HD#5, POD#17, stable.    Neuro: c/w PO tylenol   CV: Hemodynamically stable   ID: Continue on zosyn, now s/p gentamicin and clindamycin (started 9/12). Afebrile overnight, prelim blood culture (9/12) NGTD, urine culture (9/12) w < 10,000 CFU; culture from IR drain (9/13) NGTD, f/u final; continue to trend WBC, f/u AM labs, continue to trend fever curve  Pulm: Saturating well on room air, encourage incentive spirometry  GI: NPO overnight for possible IR drainage  : voiding spontaneously  Heme: c/w SCDs for DVT ppx. HSQ held overnight for possible IR drainage today.  Dispo: Continue routine post-op care    CARLITA Palacio PGY3

## 2019-09-16 NOTE — PROGRESS NOTE ADULT - SUBJECTIVE AND OBJECTIVE BOX
28y old  Female who presents with a chief complaint of fever.       Interval history:  Febrile, worsening abdominal pain in the right side.       No Known Allergies      Antimicrobials:  piperacillin/tazobactam IVPB.. 3.375 Gram(s) IV Intermittent every 8 hours  vancomycin  IVPB 1000 milliGRAM(s) IV Intermittent every 12 hours      REVIEW OF SYSTEMS:  No chest pain  No SOB  No dysuria  No rash.       Vital Signs Last 24 Hrs  T(C): 37.9 (09-16-19 @ 12:38), Max: 38.3 (09-16-19 @ 05:47)  T(F): 100.2 (09-16-19 @ 12:38), Max: 100.9 (09-16-19 @ 05:47)  HR: 96 (09-16-19 @ 12:38) (89 - 103)  BP: 92/62 (09-16-19 @ 12:38) (90/63 - 102/70)  BP(mean): --  RR: 18 (09-16-19 @ 12:38) (18 - 18)  SpO2: 98% (09-16-19 @ 12:38) (96% - 98%)      PHYSICAL EXAM:  Patient in some distress due to pain.   Cardiovascular: S1S2 normal, tachy.   Lungs: + air entry B/L lung fields.  Gastrointestinal: soft, + rt sided lower abdominal tenderness, nondistended. Erythema improved more concentrated around the incision.   Extremities: no edema.  IV sites not inflamed.                           8.6    16.9  )-----------( 655      ( 16 Sep 2019 06:09 )             28.0         Culture - Body Fluid with Gram Stain (collected 13 Sep 2019 21:28)  Source: Abdominal Fl abdominal wall fluid collection  Gram Stain (14 Sep 2019 00:49):    polymorphonuclear leukocytes seen    No organisms seen    by cytocentrifuge  Preliminary Report (14 Sep 2019 15:58):    No growth to date.       Radiology:  < from: CT Abdomen and Pelvis No Cont (09.16.19 @ 09:20) >  IMPRESSION:     Complex collection in the lower anterior abdominal subcutaneous tissues   extending to the right of midline, increased in size since 9/12/2019.  An   infected collection cannot be excluded.    Small collection along the incision site in the lower uterine segment,   decreased in size.    Diffuse hepatic steatosis.

## 2019-09-16 NOTE — PROGRESS NOTE ADULT - ASSESSMENT
20 yo  postpartum female s/p pLTCS () for arrest of descent and NRFHT presents with fevers/chills at home with fevers to 102, found to have abd fluid collections (post-op vs abscess), cellulitis of SSI and possible endometritis.     Overall sepsis, persistent fever, worsening leucocytosis tachycardia, abdominal wall cellulitis and abscess. Bandemia       Plan:   - continue with zosyn 3.375g IV q8h  - added vanco 1 gm iv [id number] worsening symptoms.   - reviewed CT with radiology, rt sided collection increased, the lower uterine segment collection decreased.   - follow up abd fluid cultures, NTD   - reconsult IR for drainage of Rt abd fluid collection as the pt is experiencing more pain and induration there  - follow up BCx- neg to date  - monitor cbc w diff

## 2019-09-17 LAB
BASOPHILS # BLD AUTO: 0 K/UL — SIGNIFICANT CHANGE UP (ref 0–0.2)
BASOPHILS NFR BLD AUTO: 0.1 % — SIGNIFICANT CHANGE UP (ref 0–2)
CULTURE RESULTS: SIGNIFICANT CHANGE UP
CULTURE RESULTS: SIGNIFICANT CHANGE UP
EOSINOPHIL # BLD AUTO: 0.1 K/UL — SIGNIFICANT CHANGE UP (ref 0–0.5)
EOSINOPHIL NFR BLD AUTO: 0.7 % — SIGNIFICANT CHANGE UP (ref 0–6)
GRAM STN FLD: SIGNIFICANT CHANGE UP
HCT VFR BLD CALC: 29.4 % — LOW (ref 34.5–45)
HGB BLD-MCNC: 9 G/DL — LOW (ref 11.5–15.5)
LYMPHOCYTES # BLD AUTO: 10.9 % — LOW (ref 13–44)
LYMPHOCYTES # BLD AUTO: 2.2 K/UL — SIGNIFICANT CHANGE UP (ref 1–3.3)
MCHC RBC-ENTMCNC: 26.1 PG — LOW (ref 27–34)
MCHC RBC-ENTMCNC: 30.7 GM/DL — LOW (ref 32–36)
MCV RBC AUTO: 85 FL — SIGNIFICANT CHANGE UP (ref 80–100)
MONOCYTES # BLD AUTO: 1.2 K/UL — HIGH (ref 0–0.9)
MONOCYTES NFR BLD AUTO: 5.9 % — SIGNIFICANT CHANGE UP (ref 2–14)
NEUTROPHILS # BLD AUTO: 16.2 K/UL — HIGH (ref 1.8–7.4)
NEUTROPHILS NFR BLD AUTO: 82.4 % — HIGH (ref 43–77)
PLATELET # BLD AUTO: 720 K/UL — HIGH (ref 150–400)
RBC # BLD: 3.46 M/UL — LOW (ref 3.8–5.2)
RBC # FLD: 15.4 % — HIGH (ref 10.3–14.5)
SPECIMEN SOURCE: SIGNIFICANT CHANGE UP
WBC # BLD: 19.7 K/UL — HIGH (ref 3.8–10.5)
WBC # FLD AUTO: 19.7 K/UL — HIGH (ref 3.8–10.5)

## 2019-09-17 PROCEDURE — 76942 ECHO GUIDE FOR BIOPSY: CPT | Mod: 26

## 2019-09-17 PROCEDURE — 10160 PNXR ASPIR ABSC HMTMA BULLA: CPT | Mod: 78

## 2019-09-17 RX ORDER — IBUPROFEN 200 MG
600 TABLET ORAL EVERY 6 HOURS
Refills: 0 | Status: DISCONTINUED | OUTPATIENT
Start: 2019-09-17 | End: 2019-09-20

## 2019-09-17 RX ORDER — DOCUSATE SODIUM 100 MG
100 CAPSULE ORAL
Refills: 0 | Status: DISCONTINUED | OUTPATIENT
Start: 2019-09-17 | End: 2019-09-20

## 2019-09-17 RX ORDER — HEPARIN SODIUM 5000 [USP'U]/ML
5000 INJECTION INTRAVENOUS; SUBCUTANEOUS EVERY 12 HOURS
Refills: 0 | Status: DISCONTINUED | OUTPATIENT
Start: 2019-09-17 | End: 2019-09-20

## 2019-09-17 RX ADMIN — OXYCODONE HYDROCHLORIDE 5 MILLIGRAM(S): 5 TABLET ORAL at 01:30

## 2019-09-17 RX ADMIN — PIPERACILLIN AND TAZOBACTAM 25 GRAM(S): 4; .5 INJECTION, POWDER, LYOPHILIZED, FOR SOLUTION INTRAVENOUS at 01:00

## 2019-09-17 RX ADMIN — PIPERACILLIN AND TAZOBACTAM 25 GRAM(S): 4; .5 INJECTION, POWDER, LYOPHILIZED, FOR SOLUTION INTRAVENOUS at 20:20

## 2019-09-17 RX ADMIN — Medication 975 MILLIGRAM(S): at 17:40

## 2019-09-17 RX ADMIN — HEPARIN SODIUM 5000 UNIT(S): 5000 INJECTION INTRAVENOUS; SUBCUTANEOUS at 20:20

## 2019-09-17 RX ADMIN — Medication 250 MILLIGRAM(S): at 17:43

## 2019-09-17 RX ADMIN — OXYCODONE HYDROCHLORIDE 5 MILLIGRAM(S): 5 TABLET ORAL at 00:59

## 2019-09-17 RX ADMIN — Medication 975 MILLIGRAM(S): at 09:46

## 2019-09-17 RX ADMIN — OXYCODONE HYDROCHLORIDE 5 MILLIGRAM(S): 5 TABLET ORAL at 05:28

## 2019-09-17 RX ADMIN — Medication 975 MILLIGRAM(S): at 04:00

## 2019-09-17 RX ADMIN — OXYCODONE HYDROCHLORIDE 5 MILLIGRAM(S): 5 TABLET ORAL at 09:46

## 2019-09-17 RX ADMIN — OXYCODONE HYDROCHLORIDE 5 MILLIGRAM(S): 5 TABLET ORAL at 06:00

## 2019-09-17 RX ADMIN — OXYCODONE HYDROCHLORIDE 5 MILLIGRAM(S): 5 TABLET ORAL at 18:36

## 2019-09-17 RX ADMIN — OXYCODONE HYDROCHLORIDE 5 MILLIGRAM(S): 5 TABLET ORAL at 14:56

## 2019-09-17 RX ADMIN — Medication 975 MILLIGRAM(S): at 18:36

## 2019-09-17 RX ADMIN — OXYCODONE HYDROCHLORIDE 5 MILLIGRAM(S): 5 TABLET ORAL at 17:39

## 2019-09-17 RX ADMIN — Medication 975 MILLIGRAM(S): at 10:15

## 2019-09-17 RX ADMIN — OXYCODONE HYDROCHLORIDE 5 MILLIGRAM(S): 5 TABLET ORAL at 13:37

## 2019-09-17 RX ADMIN — OXYCODONE HYDROCHLORIDE 5 MILLIGRAM(S): 5 TABLET ORAL at 10:15

## 2019-09-17 RX ADMIN — Medication 250 MILLIGRAM(S): at 06:30

## 2019-09-17 RX ADMIN — PIPERACILLIN AND TAZOBACTAM 25 GRAM(S): 4; .5 INJECTION, POWDER, LYOPHILIZED, FOR SOLUTION INTRAVENOUS at 10:31

## 2019-09-17 RX ADMIN — Medication 975 MILLIGRAM(S): at 03:27

## 2019-09-17 NOTE — PROVIDER CONTACT NOTE (OTHER) - ACTION/TREATMENT ORDERED:
pt was given a dose of tylenol 975mg now
encourage po hydration
IV Tylenol given as ordered.
no treatment at this, last Tylenol PO was given at 3am.

## 2019-09-17 NOTE — PROGRESS NOTE ADULT - ASSESSMENT
A/P: 29yo s/p pLTCS 8/30, a/w postpartum endometritis on 9/12, now s/p IR aspiration 9/13 (35cc serous fluid), HD#6, POD#18, stable. Patient went to IR yesterday for repeat drainage, however, it was aborted due to patient's inability to tolerate without anesthesia.     Neuro: c/w PO tylenol   CV: Hemodynamically stable   ID: Continues on zosyn, now s/p gentamicin and clindamycin (started 9/12). Afebrile overnight, prelim blood culture (9/12) NGTD, urine culture (9/12) w < 10,000 CFU; culture from IR drain (9/13) NGTD, f/u final; continue to trend WBC, f/u AM labs, continue to trend fever curve  Pulm: Saturating well on room air, encourage incentive spirometry  GI: NPO overnight for IR drainage  : voiding spontaneously  Heme: c/w SCDs for DVT ppx. HSQ held overnight for IR drainage today.  Dispo: Continue routine care    CARLITA Palacio PGY3 A/P: 29yo s/p pLTCS 8/30, a/w postpartum endometritis on 9/12, now s/p IR aspiration 9/13 (35cc serous fluid), HD#6, POD#18, stable. Patient went to IR yesterday for repeat drainage, however, it was aborted due to patient's inability to tolerate without anesthesia. Plan for IR drainage this morning.     Neuro: c/w PO tylenol   CV: Hemodynamically stable   ID: Continues on zosyn, now s/p gentamicin and clindamycin (started 9/12). Afebrile overnight, prelim blood culture (9/12) NGTD, urine culture (9/12) w < 10,000 CFU; culture from IR drain (9/13) NGTD, f/u final; continue to trend WBC, f/u AM labs, continue to trend fever curve  Pulm: Saturating well on room air, encourage incentive spirometry  GI: NPO overnight for IR drainage  : voiding spontaneously  Heme: c/w SCDs for DVT ppx. HSQ held overnight for IR drainage today.  Dispo: Continue routine care, plan for IR drainage today     CARLITA Palacio PGY3 A/P: 27yo s/p pLTCS 8/30, a/w postpartum endometritis on 9/12, now s/p IR aspiration 9/13 (35cc serous fluid), HD#6, POD#18, stable. Patient went to IR yesterday for repeat drainage, however, it was aborted due to patient's inability to tolerate without anesthesia. Plan for IR drainage this morning.     Neuro: c/w PO tylenol   CV: Hemodynamically stable   ID: Continues on zosyn and vancomycin, now s/p gentamicin and clindamycin (started 9/12). Afebrile overnight, prelim blood culture (9/12) NGTD, urine culture (9/12) w < 10,000 CFU; culture from IR drain (9/13) gram stain w/ PMNs, f/u final; continue to trend WBC, f/u AM labs, continue to trend fever curve. Appreciate ID consult. 	  Pulm: Saturating well on room air, encourage incentive spirometry  GI: NPO overnight for IR drainage  : voiding spontaneously  Heme: c/w SCDs for DVT ppx. HSQ held overnight for IR drainage today.  Dispo: Continue routine care, plan for IR drainage today     CARLITA Palacio PGY3

## 2019-09-17 NOTE — PROVIDER CONTACT NOTE (OTHER) - SITUATION
Dr. Arreola also made aware on admission IV access not flushing trying to fix at bedside site is positional

## 2019-09-17 NOTE — PROGRESS NOTE ADULT - SUBJECTIVE AND OBJECTIVE BOX
R3 OB Note     S: 27yo  POD#18 s/ pLTCS now HD#6 a/w postpartum endometritis and seroma s/p IR aspiration on . Patient went back to IR today for drainage of right aspect of incision, however, it was aborted due to no anesthesia with plan for drainage today. Patient seen and examined at bedside this morning.     Overnight her IV infiltrated and she was a difficult stick. The IV team did obtain access but she reported burning at first, however, did tolerate continued antibiotics. No acute complaints, however, states pain on right aspect of incision is worse than it previously was. Denies any HA, blurry vision, lightheadedness, CP/SOB, N/V. Patient is ambulating, voiding spontaneously, passing flatus. She tolerated diet overnight but is now NPO.     Vital Signs Last 24 Hours  T(C): 37.4 (19 @ 00:28), Max: 38.3 (19 @ 05:47)  HR: 101 (19 @ 00:28) (94 - 104)  BP: 94/63 (19 @ 00:28) (92/62 - 110/76)  RR: 18 (19 @ 00:28) (18 - 18)  SpO2: 96% (19 @ 00:28) (96% - 98%)    Physical Exam:  General: NAD  CV: RRR  Pulm: CTAB  Abdomen: Soft, tender on right aspect of incision, non-distended  Incision: Pfannenstiel incision CDI, erythematous on right aspect of incision with fullness palpated and tender   Extremities: no calf tenderness noted on exam      Labs:    Blood Type: O Negative  Antibody Screen: Negative  RPR: Negative               8.6    16.9  )-----------( 655      (  @ 06:09 )             28.0                8.9    15.5  )-----------( 637      ( 09-15 @ 07:05 )             28.1                8.8    15.2  )-----------( 607      (  @ 06:01 )             26.8         MEDICATIONS  (STANDING):  lactated ringers. 1000 milliLiter(s) (125 mL/Hr) IV Continuous <Continuous>  piperacillin/tazobactam IVPB.. 3.375 Gram(s) IV Intermittent every 8 hours  vancomycin  IVPB 1000 milliGRAM(s) IV Intermittent every 12 hours    MEDICATIONS  (PRN):  acetaminophen   Tablet .. 975 milliGRAM(s) Oral every 6 hours PRN Moderate Pain (4 - 6)  oxyCODONE    IR 5 milliGRAM(s) Oral every 4 hours PRN Severe Pain (7 - 10) R3 OB Note     S: 27yo  POD#18 s/ pLTCS now HD#6 a/w postpartum endometritis and seroma s/p IR aspiration on . Patient went back to IR today for drainage of right aspect of incision, however, it was aborted due to no anesthesia with plan for drainage today. Patient seen and examined at bedside this morning.     Overnight her IV infiltrated. The IV team did obtain IV access in left forearm but she reported burning at first, however, did tolerate continued antibiotics. No acute complaints, however, states pain on right aspect of incision is worse than it previously was. Denies any HA, blurry vision, lightheadedness, CP/SOB, N/V. Patient is ambulating, voiding spontaneously, passing flatus. She tolerated diet overnight but is now NPO.     Vital Signs Last 24 Hours  T(C): 37.4 (19 @ 00:28), Max: 38.3 (19 @ 05:47)  HR: 101 (19 @ 00:28) (94 - 104)  BP: 94/63 (19 @ 00:28) (92/62 - 110/76)  RR: 18 (19 @ 00:28) (18 - 18)  SpO2: 96% (19 @ 00:28) (96% - 98%)    Physical Exam:  General: NAD  CV: RRR  Pulm: CTAB  Abdomen: Soft, tender on right aspect of incision, non-distended  Incision: Pfannenstiel incision CDI, erythematous and indurated on right aspect of incision with fullness palpated and tenderness   Extremities: no calf tenderness noted on exam      Labs:    Blood Type: O Negative  Antibody Screen: Negative  RPR: Negative               8.6    16.9  )-----------( 655      (  @ 06:09 )             28.0                8.9    15.5  )-----------( 637      ( 09-15 @ 07:05 )             28.1                8.8    15.2  )-----------( 607      (  @ 06:01 )             26.8         MEDICATIONS  (STANDING):  lactated ringers. 1000 milliLiter(s) (125 mL/Hr) IV Continuous <Continuous>  piperacillin/tazobactam IVPB.. 3.375 Gram(s) IV Intermittent every 8 hours  vancomycin  IVPB 1000 milliGRAM(s) IV Intermittent every 12 hours    MEDICATIONS  (PRN):  acetaminophen   Tablet .. 975 milliGRAM(s) Oral every 6 hours PRN Moderate Pain (4 - 6)  oxyCODONE    IR 5 milliGRAM(s) Oral every 4 hours PRN Severe Pain (7 - 10) R3 OB Note     S: 27yo  POD#18 s/ pLTCS now HD#6 a/w postpartum endometritis and seroma s/p IR aspiration on . Patient went back to IR today for drainage of right aspect of incision, however, it was aborted due to no anesthesia with plan for drainage today. Patient seen and examined at bedside this morning.     Overnight her IV infiltrated. The IV team did obtain IV access in left forearm but she reported burning at first, however, did tolerate continued antibiotics. No acute complaints, however, states pain on right aspect of incision is worse than it previously was. At 0551 patient noted to be febrile to 38.3. Denies any HA, blurry vision, lightheadedness, CP/SOB, N/V. Patient is ambulating, voiding spontaneously, passing flatus. She tolerated diet overnight but is now NPO.     Vital Signs Last 24 Hrs  T(C): 38.3 (17 Sep 2019 05:47), Max: 38.3 (17 Sep 2019 05:47)  T(F): 100.9 (17 Sep 2019 05:47), Max: 100.9 (17 Sep 2019 05:47)  HR: 111 (17 Sep 2019 05:47) (96 - 111)  BP: 103/61 (17 Sep 2019 05:47) (92/62 - 110/76)  RR: 18 (17 Sep 2019 05:47) (18 - 18)  SpO2: 95% (17 Sep 2019 05:47) (95% - 98%)    Physical Exam:  General: NAD  CV: RRR  Pulm: CTAB  Abdomen: Soft, tender on right aspect of incision, non-distended  Incision: Pfannenstiel incision CDI, erythematous and indurated on right aspect of incision with fullness palpated and tenderness   Extremities: no calf tenderness noted on exam      Labs:    Blood Type: O Negative  Antibody Screen: Negative  RPR: Negative               8.6    16.9  )-----------( 655      (  @ 06:09 )             28.0                8.9    15.5  )-----------( 637      ( 09-15 @ 07:05 )             28.1                8.8    15.2  )-----------( 607      (  @ 06:01 )             26.8         MEDICATIONS  (STANDING):  lactated ringers. 1000 milliLiter(s) (125 mL/Hr) IV Continuous <Continuous>  piperacillin/tazobactam IVPB.. 3.375 Gram(s) IV Intermittent every 8 hours  vancomycin  IVPB 1000 milliGRAM(s) IV Intermittent every 12 hours    MEDICATIONS  (PRN):  acetaminophen   Tablet .. 975 milliGRAM(s) Oral every 6 hours PRN Moderate Pain (4 - 6)  oxyCODONE    IR 5 milliGRAM(s) Oral every 4 hours PRN Severe Pain (7 - 10)

## 2019-09-17 NOTE — CHART NOTE - NSCHARTNOTEFT_GEN_A_CORE
R3 OB Note     Patient s/p IR drainage of R abdominal wall fluid collection with 95cc brown cloudy viscous fluid aspirated and DUSTIN drain left in place. After arriving back to room, patient noted to be febrile to 38.7. Patient seen at bedside, reports feeling overall improved. States she ate dinner upon coming back, denies any N/V. Reports that upon ambulating back from restroom she felt dizziness that resolved upon sitting. Denies any HA, blurry vision, N/V, dysuria, constipation. She is resting comfortably in bed at this time without complaint.     VS  T(C): 37.1 (09-17-19 @ 20:20)  HR: 97 (09-17-19 @ 20:20)  BP: 90/64 (09-17-19 @ 20:20)  RR: 18 (09-17-19 @ 20:20)  SpO2: 95% (09-17-19 @ 20:20)    Physical Exam:  General: NAD  CV: NR, RR, S1, S2, no M/R/G  Lungs: CTA-B  Abdomen: Soft, tender w/ erythema on right aspect of incision, non-distended  Incision: Pfannenstiel incision CDI, erythematous and indurated on right aspect of incision with fullness palpated and tenderness, although improved; RLQ DUSTIN drain in place w/ cloudy brown serous drainage in drain   Ext: No pain or swelling    A/P: 29yo s/p pLTCS 8/30, a/w postpartum endometritis on 9/12, now s/p IR aspiration 9/13 (35cc serous fluid) and again today 9/17 with (95cc serous fluid) drained w/ DUSTIN drain in place, HD#6, POD#18, stable.   - Continue zosyn and vancomycin   - Repeat blood cultures drawn when febrile this evening. Thus far blood and urine cultures no growth to date.  - Encouraged po hydration and regular diet   - Will continue to closely monitor     Dr. Chuck Palacio PGY3 R3 OB Note     Patient s/p IR drainage of R abdominal wall fluid collection with 95cc brown cloudy viscous fluid aspirated and DUSTIN drain left in place. After arriving back to room, patient noted to be febrile to 38.7. Patient seen at bedside, reports feeling overall improved. States she ate dinner upon coming back, denies any N/V. Reports that upon ambulating back from restroom she felt dizziness that resolved upon sitting. Denies any HA, blurry vision, N/V, dysuria, constipation. She is resting comfortably in bed at this time without complaint.     VS  T(C): 37.1 (09-17-19 @ 20:20)  HR: 97 (09-17-19 @ 20:20)  BP: 90/64 (09-17-19 @ 20:20)  RR: 18 (09-17-19 @ 20:20)  SpO2: 95% (09-17-19 @ 20:20)    Physical Exam:  General: NAD  CV: NR, RR, S1, S2, no M/R/G  Lungs: CTA-B  Abdomen: Soft, tender w/ erythema on right aspect of incision, non-distended  Incision: Pfannenstiel incision CDI, erythematous and indurated on right aspect of incision with fullness palpated and tenderness, although improved; RLQ DUSTIN drain in place w/ cloudy brown serous drainage in drain   Ext: No pain or swelling    A/P: 27yo s/p pLTCS 8/30, a/w postpartum endometritis on 9/12, now s/p IR aspiration 9/13 (35cc serous fluid) and again today 9/17 with (95cc serous fluid) drained w/ DUSTIN drain in place, HD#6, POD#18, stable.   - Continue zosyn and vancomycin   - Repeat blood cultures drawn when febrile this evening. Thus far blood and urine cultures no growth to date.  - Encouraged po hydration and regular diet   - Will continue to closely monitor     Dr. Chuck Palacio PGY3      /Attending:    The above pt was discussed with me and I have reviewed the chart.  I have read and reviewed the above assessment and plan and agree.  Pt to continue with IV abx (Zosyn and Vancomycin) and will f/u cultures.  Continue to monitor closely and for any further Temp. spikes.    Dr. Mayers

## 2019-09-17 NOTE — PROGRESS NOTE ADULT - SUBJECTIVE AND OBJECTIVE BOX
Interventional Radiology Pre-Procedure Note    Procedure: This is a 28y Female with a right abdominal wall fluid collection and pain presenting for drainage. Pt was unable to tolerate the procedure yesterday and is planned for drainage today with anesthesia    PAST MEDICAL & SURGICAL HISTORY:  Gestational diabetes mellitus treated with oral hypoglycemic therapy  H/O:        Female    Allergies: No Known Allergies      LABS:  CBC Full  -  ( 17 Sep 2019 07:30 )  WBC Count : 19.7 K/uL  RBC Count : 3.46 M/uL  Hemoglobin : 9.0 g/dL  Hematocrit : 29.4 %  Platelet Count - Automated : 720 K/uL  Mean Cell Volume : 85.0 fl  Mean Cell Hemoglobin : 26.1 pg  Mean Cell Hemoglobin Concentration : 30.7 gm/dL  Auto Neutrophil # : 16.2 K/uL  Auto Lymphocyte # : 2.2 K/uL  Auto Monocyte # : 1.2 K/uL  Auto Eosinophil # : 0.1 K/uL  Auto Basophil # : 0.0 K/uL  Auto Neutrophil % : 82.4 %  Auto Lymphocyte % : 10.9 %  Auto Monocyte % : 5.9 %  Auto Eosinophil % : 0.7 %  Auto Basophil % : 0.1 %          PT/INR - ( 16 Sep 2019 06:09 )   PT: 16.4 sec;   INR: 1.42 ratio         PTT - ( 16 Sep 2019 06:09 )  PTT:30.9 sec    A/P Right abdominal wall fluid collection drainage, possible drainage catheter insertion. Procedure/ risks/ benefits were explained, informed consent obtained from patient, verbalizes understanding.

## 2019-09-17 NOTE — PROGRESS NOTE ADULT - SUBJECTIVE AND OBJECTIVE BOX
Interventional Radiology Brief- Operative Note    Procedure: Drainage of right abdominal wall fluid collection    Operators: Kellie    Anesthesia (type): MAC. lidocaine local    Contrast: none    EBL: none    Findings/Follow up Plan of Care: Drainage of right abdominal wall fluid collection performed. 10 Fr drain placed. Appx 95cc brown cloudy viscous fluid aspirated. Drain placed to DUSTIN. Pt tolerated procedure without difficulty. Full report to follow    Specimens Removed: sample collected for micorbiology    Implants: as above    Complications: none    Condition/Disposition: stable / pacu    Please call Interventional Radiology x [phone] with any questions, concerns, or issues.

## 2019-09-18 LAB
BASOPHILS # BLD AUTO: 0 K/UL — SIGNIFICANT CHANGE UP (ref 0–0.2)
BASOPHILS NFR BLD AUTO: 0.1 % — SIGNIFICANT CHANGE UP (ref 0–2)
CULTURE RESULTS: SIGNIFICANT CHANGE UP
EOSINOPHIL # BLD AUTO: 0.2 K/UL — SIGNIFICANT CHANGE UP (ref 0–0.5)
EOSINOPHIL NFR BLD AUTO: 1.3 % — SIGNIFICANT CHANGE UP (ref 0–6)
HCT VFR BLD CALC: 26.9 % — LOW (ref 34.5–45)
HGB BLD-MCNC: 8.7 G/DL — LOW (ref 11.5–15.5)
LYMPHOCYTES # BLD AUTO: 14.9 % — SIGNIFICANT CHANGE UP (ref 13–44)
LYMPHOCYTES # BLD AUTO: 2.1 K/UL — SIGNIFICANT CHANGE UP (ref 1–3.3)
MCHC RBC-ENTMCNC: 27.9 PG — SIGNIFICANT CHANGE UP (ref 27–34)
MCHC RBC-ENTMCNC: 32.4 GM/DL — SIGNIFICANT CHANGE UP (ref 32–36)
MCV RBC AUTO: 86.1 FL — SIGNIFICANT CHANGE UP (ref 80–100)
MONOCYTES # BLD AUTO: 0.8 K/UL — SIGNIFICANT CHANGE UP (ref 0–0.9)
MONOCYTES NFR BLD AUTO: 5.8 % — SIGNIFICANT CHANGE UP (ref 2–14)
NEUTROPHILS # BLD AUTO: 10.8 K/UL — HIGH (ref 1.8–7.4)
NEUTROPHILS NFR BLD AUTO: 77.9 % — HIGH (ref 43–77)
PLATELET # BLD AUTO: 605 K/UL — HIGH (ref 150–400)
RBC # BLD: 3.12 M/UL — LOW (ref 3.8–5.2)
RBC # FLD: 15.2 % — HIGH (ref 10.3–14.5)
SPECIMEN SOURCE: SIGNIFICANT CHANGE UP
WBC # BLD: 13.9 K/UL — HIGH (ref 3.8–10.5)
WBC # FLD AUTO: 13.9 K/UL — HIGH (ref 3.8–10.5)

## 2019-09-18 PROCEDURE — 99232 SBSQ HOSP IP/OBS MODERATE 35: CPT

## 2019-09-18 RX ORDER — PIPERACILLIN AND TAZOBACTAM 4; .5 G/20ML; G/20ML
3.38 INJECTION, POWDER, LYOPHILIZED, FOR SOLUTION INTRAVENOUS EVERY 8 HOURS
Refills: 0 | Status: DISCONTINUED | OUTPATIENT
Start: 2019-09-18 | End: 2019-09-20

## 2019-09-18 RX ADMIN — PIPERACILLIN AND TAZOBACTAM 25 GRAM(S): 4; .5 INJECTION, POWDER, LYOPHILIZED, FOR SOLUTION INTRAVENOUS at 12:59

## 2019-09-18 RX ADMIN — Medication 975 MILLIGRAM(S): at 07:00

## 2019-09-18 RX ADMIN — Medication 975 MILLIGRAM(S): at 00:19

## 2019-09-18 RX ADMIN — HEPARIN SODIUM 5000 UNIT(S): 5000 INJECTION INTRAVENOUS; SUBCUTANEOUS at 09:48

## 2019-09-18 RX ADMIN — Medication 250 MILLIGRAM(S): at 06:21

## 2019-09-18 RX ADMIN — OXYCODONE HYDROCHLORIDE 5 MILLIGRAM(S): 5 TABLET ORAL at 00:19

## 2019-09-18 RX ADMIN — OXYCODONE HYDROCHLORIDE 5 MILLIGRAM(S): 5 TABLET ORAL at 15:30

## 2019-09-18 RX ADMIN — Medication 600 MILLIGRAM(S): at 13:17

## 2019-09-18 RX ADMIN — PIPERACILLIN AND TAZOBACTAM 25 GRAM(S): 4; .5 INJECTION, POWDER, LYOPHILIZED, FOR SOLUTION INTRAVENOUS at 22:05

## 2019-09-18 RX ADMIN — Medication 100 MILLIGRAM(S): at 06:20

## 2019-09-18 RX ADMIN — OXYCODONE HYDROCHLORIDE 5 MILLIGRAM(S): 5 TABLET ORAL at 04:23

## 2019-09-18 RX ADMIN — OXYCODONE HYDROCHLORIDE 5 MILLIGRAM(S): 5 TABLET ORAL at 10:15

## 2019-09-18 RX ADMIN — OXYCODONE HYDROCHLORIDE 5 MILLIGRAM(S): 5 TABLET ORAL at 23:10

## 2019-09-18 RX ADMIN — HEPARIN SODIUM 5000 UNIT(S): 5000 INJECTION INTRAVENOUS; SUBCUTANEOUS at 20:48

## 2019-09-18 RX ADMIN — OXYCODONE HYDROCHLORIDE 5 MILLIGRAM(S): 5 TABLET ORAL at 18:59

## 2019-09-18 RX ADMIN — OXYCODONE HYDROCHLORIDE 5 MILLIGRAM(S): 5 TABLET ORAL at 19:40

## 2019-09-18 RX ADMIN — Medication 100 MILLIGRAM(S): at 18:23

## 2019-09-18 RX ADMIN — OXYCODONE HYDROCHLORIDE 5 MILLIGRAM(S): 5 TABLET ORAL at 04:55

## 2019-09-18 RX ADMIN — Medication 975 MILLIGRAM(S): at 06:20

## 2019-09-18 RX ADMIN — OXYCODONE HYDROCHLORIDE 5 MILLIGRAM(S): 5 TABLET ORAL at 14:40

## 2019-09-18 RX ADMIN — Medication 975 MILLIGRAM(S): at 18:22

## 2019-09-18 RX ADMIN — OXYCODONE HYDROCHLORIDE 5 MILLIGRAM(S): 5 TABLET ORAL at 09:49

## 2019-09-18 RX ADMIN — OXYCODONE HYDROCHLORIDE 5 MILLIGRAM(S): 5 TABLET ORAL at 01:00

## 2019-09-18 RX ADMIN — Medication 600 MILLIGRAM(S): at 14:00

## 2019-09-18 RX ADMIN — OXYCODONE HYDROCHLORIDE 5 MILLIGRAM(S): 5 TABLET ORAL at 23:45

## 2019-09-18 RX ADMIN — Medication 250 MILLIGRAM(S): at 18:16

## 2019-09-18 RX ADMIN — Medication 975 MILLIGRAM(S): at 19:00

## 2019-09-18 RX ADMIN — Medication 975 MILLIGRAM(S): at 01:00

## 2019-09-18 RX ADMIN — PIPERACILLIN AND TAZOBACTAM 25 GRAM(S): 4; .5 INJECTION, POWDER, LYOPHILIZED, FOR SOLUTION INTRAVENOUS at 02:49

## 2019-09-18 NOTE — DIETITIAN INITIAL EVALUATION ADULT. - ADD RECOMMEND
Discussed healthy meal choices, consuming fruits and vegetables to promote regularity of BMs, monitoring portion sizes and being as active as medically feasible at this time. Reinforced need for 4-12 weeks follow up c MD to assess for T2DM. Fair to good compliance expected.

## 2019-09-18 NOTE — PROGRESS NOTE ADULT - SUBJECTIVE AND OBJECTIVE BOX
R3 OB Progress Note   HD#7, POD#19     S: 29yo  POD#18 s/ pLTCS now HD#6 a/w postpartum endometritis and seroma s/p IR aspiration on . Patient went back to IR yesterday and had 95cc brown cloudy serous fluid drained with RLQ drain left in place.     Patient seen and examined at bedside, no acute overnight events. No acute complaints. Reports overall improvement, complains of mild/moderate discomfort at right aspect of incision. Denies any HA, blurry vision, lightheadedness, CP/SOB, N/V. Patient is ambulating, voiding spontaneously, passing flatus, and tolerating regular diet.     Vital Signs Last 24 Hours  T(C): 36.9 (19 @ 00:52), Max: 38.7 (19 @ 17:38)  HR: 83 (19 @ 00:52) (83 - 111)  BP: 92/65 (19 @ 00:52) (90/64 - 109/74)  RR: 18 (19 @ 00:52) (16 - 18)  SpO2: 95% (19 @ 00:52) (95% - 97%)    Physical Exam:  General: NAD  CV: RRR  Pulm: CTAB  Abdomen: Soft, tender on right aspect of incision, non-distended  Incision: Pfannenstiel incision CDI, erythematous and indurated on right aspect of incision with fullness palpated and tenderness   Extremities: no calf tenderness noted on exam    Labs:    Blood Type: O Negative  Antibody Screen: Negative  RPR: Negative               9.0    19.7  )-----------( 720      (  @ 07:30 )             29.4                8.6    16.9  )-----------( 655      (  @ 06:09 )             28.0                8.9    15.5  )-----------( 637      ( 09-15 @ 07:05 )             28.1         MEDICATIONS  (STANDING):  docusate sodium 100 milliGRAM(s) Oral two times a day  heparin  Injectable 5000 Unit(s) SubCutaneous every 12 hours  lactated ringers. 1000 milliLiter(s) (125 mL/Hr) IV Continuous <Continuous>  piperacillin/tazobactam IVPB.. 3.375 Gram(s) IV Intermittent every 8 hours  vancomycin  IVPB 1000 milliGRAM(s) IV Intermittent every 12 hours    MEDICATIONS  (PRN):  acetaminophen   Tablet .. 975 milliGRAM(s) Oral every 6 hours PRN Moderate Pain (4 - 6)  ibuprofen  Tablet. 600 milliGRAM(s) Oral every 6 hours PRN Moderate Pain (4 - 6)  oxyCODONE    IR 5 milliGRAM(s) Oral every 4 hours PRN Severe Pain (7 - 10) R3 OB Progress Note   HD#7, POD#19     S: 27yo  POD#18 s/ pLTCS now HD#7 a/w postpartum endometritis and seroma s/p IR aspiration on . Patient went back to IR  and had 95cc brown cloudy serous fluid drained with RLQ drain left in place.     Patient seen and examined at bedside, no acute overnight events. No acute complaints. Reports overall improvement, complains of mild/moderate discomfort at right aspect of incision. Denies any HA, blurry vision, lightheadedness, CP/SOB, N/V. Patient is ambulating, voiding spontaneously, passing flatus, and tolerating regular diet.     Vital Signs Last 24 Hours  T(C): 36.9 (19 @ 00:52), Max: 38.7 (19 @ 17:38)  HR: 83 (19 @ 00:52) (83 - 111)  BP: 92/65 (19 @ 00:52) (90/64 - 109/74)  RR: 18 (19 @ 00:52) (16 - 18)  SpO2: 95% (19 @ 00:52) (95% - 97%)    Physical Exam:  General: NAD  CV: RRR  Pulm: CTAB  Abdomen: Soft, tender on right aspect of incision, non-distended  Incision: Pfannenstiel incision CDI, erythematous and indurated on right aspect of incision with fullness palpated and tenderness   Extremities: no calf tenderness noted on exam    Labs:    Blood Type: O Negative  Antibody Screen: Negative  RPR: Negative               9.0    19.7  )-----------( 720      (  @ 07:30 )             29.4                8.6    16.9  )-----------( 655      (  @ 06:09 )             28.0                8.9    15.5  )-----------( 637      ( 09-15 @ 07:05 )             28.1         MEDICATIONS  (STANDING):  docusate sodium 100 milliGRAM(s) Oral two times a day  heparin  Injectable 5000 Unit(s) SubCutaneous every 12 hours  lactated ringers. 1000 milliLiter(s) (125 mL/Hr) IV Continuous <Continuous>  piperacillin/tazobactam IVPB.. 3.375 Gram(s) IV Intermittent every 8 hours  vancomycin  IVPB 1000 milliGRAM(s) IV Intermittent every 12 hours    MEDICATIONS  (PRN):  acetaminophen   Tablet .. 975 milliGRAM(s) Oral every 6 hours PRN Moderate Pain (4 - 6)  ibuprofen  Tablet. 600 milliGRAM(s) Oral every 6 hours PRN Moderate Pain (4 - 6)  oxyCODONE    IR 5 milliGRAM(s) Oral every 4 hours PRN Severe Pain (7 - 10) R3 OB Progress Note   HD#7, POD#19     S: 29yo  POD#18 s/ pLTCS now HD#7 a/w postpartum endometritis and seroma s/p IR aspiration on . Patient went back to IR  and had 95cc brown cloudy serous fluid drained with RLQ drain left in place.     Patient seen and examined at bedside, no acute overnight events. No acute complaints. Reports overall improvement, complains of mild/moderate discomfort at right aspect of incision. Denies any HA, blurry vision, lightheadedness, CP/SOB, N/V. Patient is ambulating, voiding spontaneously, passing flatus, and tolerating regular diet.     Vital Signs Last 24 Hours  T(C): 36.9 (19 @ 00:52), Max: 38.7 (19 @ 17:38)  HR: 83 (19 @ 00:52) (83 - 111)  BP: 92/65 (19 @ 00:52) (90/64 - 109/74)  RR: 18 (19 @ 00:52) (16 - 18)  SpO2: 95% (19 @ 00:52) (95% - 97%)    Physical Exam:  General: NAD  CV: RRR  Pulm: CTAB  Abdomen: Soft, tender on right aspect of incision, non-distended  Incision: Pfannenstiel incision CDI, erythematous and indurated on right aspect of incision with fullness palpated and tenderness; RLQ DUSTIN drain with serous brown cloudy drainage  Extremities: no calf tenderness noted on exam    Labs:    Blood Type: O Negative  Antibody Screen: Negative  RPR: Negative               9.0    19.7  )-----------( 720      (  @ 07:30 )             29.4                8.6    16.9  )-----------( 655      (  @ 06:09 )             28.0                8.9    15.5  )-----------( 637      ( 09-15 @ 07:05 )             28.1         MEDICATIONS  (STANDING):  docusate sodium 100 milliGRAM(s) Oral two times a day  heparin  Injectable 5000 Unit(s) SubCutaneous every 12 hours  lactated ringers. 1000 milliLiter(s) (125 mL/Hr) IV Continuous <Continuous>  piperacillin/tazobactam IVPB.. 3.375 Gram(s) IV Intermittent every 8 hours  vancomycin  IVPB 1000 milliGRAM(s) IV Intermittent every 12 hours    MEDICATIONS  (PRN):  acetaminophen   Tablet .. 975 milliGRAM(s) Oral every 6 hours PRN Moderate Pain (4 - 6)  ibuprofen  Tablet. 600 milliGRAM(s) Oral every 6 hours PRN Moderate Pain (4 - 6)  oxyCODONE    IR 5 milliGRAM(s) Oral every 4 hours PRN Severe Pain (7 - 10)

## 2019-09-18 NOTE — PROGRESS NOTE ADULT - SUBJECTIVE AND OBJECTIVE BOX
28y old  Female who presents with a chief complaint of fever      Interval history:  Afebrile, pain much better, denies N/V.       No Known Allergies      Antimicrobials:  vancomycin  IVPB 1000 milliGRAM(s) IV Intermittent every 12 hours      REVIEW OF SYSTEMS:  No chest pain  No cough, no SOB  No dysuria  No rash.     Vital Signs Last 24 Hrs  T(C): 36.7 (09-18-19 @ 17:40), Max: 37.1 (09-17-19 @ 20:20)  T(F): 98.1 (09-18-19 @ 17:40), Max: 98.7 (09-17-19 @ 20:20)  HR: 87 (09-18-19 @ 17:40) (70 - 97)  BP: 107/76 (09-18-19 @ 17:40) (90/63 - 107/76)  BP(mean): --  RR: 18 (09-18-19 @ 17:40) (18 - 18)  SpO2: 97% (09-18-19 @ 17:40) (95% - 98%)      PHYSICAL EXAM:  Patient in NAD, AAO X 3.   Cardiovascular: S1S2 normal,   Lungs: + air entry B/L lung fields.  Gastrointestinal: soft, + rt sided drain, + lower abdominal erythema with warmth.    Extremities: no edema.  IV sites not inflamed.                             8.7    13.9  )-----------( 605      ( 18 Sep 2019 06:13 )             26.9           Culture - Body Fluid with Gram Stain (collected 17 Sep 2019 20:28)  Source: .Body Fluid abdominal abscess  Gram Stain (17 Sep 2019 23:02):    Numerous polymorphonuclear leukocytes per low power field    Rare Gram positive cocci in pairs per oil power field

## 2019-09-18 NOTE — PROGRESS NOTE ADULT - ASSESSMENT
22 yo  postpartum female s/p pLTCS () for arrest of descent and NRFHT presents with fevers/chills at home with fevers to 102, found to have abd fluid collections (post-op vs abscess), cellulitis of SSI and possible endometritis.     Overall sepsis, resolved fever, improving leucocytosis, abdominal wall cellulitis and abscess.       Plan:   - continue with zosyn 3.375g IV q8h, gm stain with GPC in pairs.   - stopped vanco  - follow up abd fluid cultures,   - follow up BCx- neg to date  - monitor cbc w diff

## 2019-09-18 NOTE — DIETITIAN INITIAL EVALUATION ADULT. - FACTORS AFF FOOD INTAKE
pt reports fairly good intake at this time, consuming at least 75% of most meals; reports no chewing/swallowing issues; reports BMs every few days, last BM on 9/14, reports stool softener seems to be helping

## 2019-09-18 NOTE — PROGRESS NOTE ADULT - ASSESSMENT
A/P: 27yo s/p pLTCS 8/30, a/w postpartum endometritis on 9/12, now s/p IR aspiration 9/13 (35cc serous fluid), HD#7 POD#19, stable. Patient went to IR yesterday for drainage of 95cc brown cloudy serous drainage with RLQ DUSTIN drain in place, in stable condition.     Neuro: c/w PO tylenol   CV: Hemodynamically stable. AM CBC w/ diff pending.   ID: Continues on zosyn and vancomycin, now s/p gentamicin and clindamycin (started 9/12). Prelim blood culture (9/12) NGTD, urine culture (9/12) w < 10,000 CFU; culture from IR drain (9/13) gram stain w/ PMNs, f/u final; continue to trend WBC, f/u AM labs, continue to trend fever curve. Appreciate ID consult. 	  Pulm: Saturating well on room air, encourage incentive spirometry  GI: Regular diet   : voiding spontaneously  Heme: c/w SCDs and HSQ for DVT ppx.   Dispo: Continue routine care, continue IV antibiotics    CARLITA Palacio PGY3 A/P: 27yo s/p pLTCS 8/30, a/w postpartum endometritis on 9/12, now s/p IR aspiration 9/13 (35cc serous fluid), HD#7 POD#19, stable. Patient went to IR yesterday for drainage of 95cc brown cloudy serous drainage with RLQ DUSTIN drain in place, in stable condition.     Neuro: c/w PO tylenol   CV: Hemodynamically stable. AM CBC w/ diff pending.   ID: Continues on zosyn and vancomycin, now s/p gentamicin and clindamycin (started 9/12). Prelim blood culture (9/12) NGTD, urine culture (9/12) w < 10,000 CFU; culture from IR drain (9/13) gram stain w/ PMNs, f/u final; continue to trend WBC, f/u AM labs, continue to trend fever curve. Repeat blood cultures (9/17) pending. Appreciate ID consult. 	  Pulm: Saturating well on room air, encourage incentive spirometry  GI: Regular diet   : voiding spontaneously  Heme: c/w SCDs and HSQ for DVT ppx.   Dispo: Continue routine care, continue IV antibiotics    CARLITA Palacio PGY3

## 2019-09-18 NOTE — DIETITIAN INITIAL EVALUATION ADULT. - PHYSICAL APPEARANCE
well nourished/other (specify) Skin: intact  Edema: +1 generalized as per flow sheets from 9/17  ht: 62", wt: 228pounds, BMI: 41.7kg/m2, IBW: 110pounds +/- 10%

## 2019-09-18 NOTE — PROGRESS NOTE ADULT - SUBJECTIVE AND OBJECTIVE BOX
Interventional Radiology    S/P right abdominal wall fluid collection drainage, POD # 1.      Vital Signs Last 24 Hrs  T(C): 36.5 (18 Sep 2019 08:48), Max: 38.7 (17 Sep 2019 17:38)  T(F): 97.7 (18 Sep 2019 08:48), Max: 101.7 (17 Sep 2019 17:38)  HR: 77 (18 Sep 2019 08:48) (70 - 102)  BP: 96/58 (18 Sep 2019 08:48) (90/63 - 109/74)  BP(mean): --  RR: 18 (18 Sep 2019 08:48) (18 - 18)  SpO2: 97% (18 Sep 2019 08:48) (95% - 97%)    General Appearance:     Procedure Site (Right side abdomen): Catheter     I&O's Detail    Drainage catheter OUT:    Bulb: 19 7AM 124.5 mL    LABS:                      8.7    13.9  )-----------( 605      ( 18 Sep 2019 06:13 )             26.9     Culture: Culture - Body Fluid with Gram Stain (19 @ 20:28)    Gram Stain:   Numerous polymorphonuclear leukocytes per low power field  Rare Gram positive cocci in pairs per oil power field    Specimen Source: .Body Fluid abdominal abscess    Culture - Body Fluid with Gram Stain (19 @ 21:28)    Gram Stain:   polymorphonuclear leukocytes seen  No organisms seen  by cytocentrifuge    Specimen Source: Abdominal Fl abdominal wall fluid collectio    Culture Results:   No growth to date.        A/P  28y Female with H/O  on 2019 with abdominal wall fluid collection s/p percutaneous drainage on 19    Continue care as per primary team  Maintain dressing around the site, forward flush the drainage catheter as ordered, monitor/record daily outputs.    Charles Solitario, BRITTA-C  [facility] [id number]  Ext [phone] Interventional Radiology    S/P right abdominal wall fluid collection drainage, POD # 1.        Pt reported that she still has abdominal pain but it has improved since the drainage procedure.  She denies nausea or vomiting.      Vital Signs Last 24 Hrs  T(C): 36.5 (18 Sep 2019 08:48), Max: 38.7 (17 Sep 2019 17:38)  T(F): 97.7 (18 Sep 2019 08:48), Max: 101.7 (17 Sep 2019 17:38)  HR: 77 (18 Sep 2019 08:48) (70 - 102)  BP: 96/58 (18 Sep 2019 08:48) (90/63 - 109/74)  BP(mean): --  RR: 18 (18 Sep 2019 08:48) (18 - 18)  SpO2: 97% (18 Sep 2019 08:48) (95% - 97%)    General Appearance: pt was seen at bedside with LIDIA Albarado present     Procedure Site (Right side abdomen): Catheter was flushed with 5 ml sterile NS and it leaked around skin entry site, dressing was found soiled and dressing was changed    I&O's Detail    Drainage catheter OUT:    Bulb: 19 7AM 124.5 mL    LABS:                      8.7    13.9  )-----------( 605      ( 18 Sep 2019 06:13 )             26.9     Culture: Culture - Body Fluid with Gram Stain (19 @ 20:28)    Gram Stain:   Numerous polymorphonuclear leukocytes per low power field  Rare Gram positive cocci in pairs per oil power field    Specimen Source: .Body Fluid abdominal abscess    Culture - Body Fluid with Gram Stain (19 @ 21:28)    Gram Stain:   polymorphonuclear leukocytes seen  No organisms seen  by cytocentrifuge    Specimen Source: Abdominal Fl abdominal wall fluid collectio    Culture Results:   No growth to date.        A/P  28y Female with H/O  on 2019 with abdominal wall fluid collection s/p percutaneous drainage on 19    Continue care as per primary team    Maintain dressing around the drainage site as needed - the pt may require several dressing changes if the dressing becomes soiled.    Forward flush the drainage catheter as ordered. Monitor and record daily outputs from the drainage.    If pt is d/c home with the drainage catheter she will need VNS to help maintain catheter.  She was given IR contact information and can call [phone] to schedule f/u appointment with IR as outpatient if she goes home with the drainage catheter.    Will d/w IR attending Dr. Hopkins.    Charles Solitario, RPA-C  [facility] [id number]  Ext [phone]

## 2019-09-18 NOTE — CHART NOTE - NSCHARTNOTEFT_GEN_A_CORE
Upon Nutritional Assessment by the Registered Dietitian your patient was determined to meet criteria / has evidence of the following diagnosis/diagnoses:          [ ]  Mild Protein Calorie Malnutrition        [ ]  Moderate Protein Calorie Malnutrition        [ ] Severe Protein Calorie Malnutrition        [ ] Unspecified Protein Calorie Malnutrition        [ ] Underweight / BMI <19        [x] Morbid Obesity / BMI > 40      Findings as based on:  [x] Comprehensive nutrition assessment   [ ] Nutrition Focused Physical Exam  [ ] Other:       Nutrition Plan/Recommendations:      Discussed healthy meal choices, consuming fruits and vegetables to promote regularity of BMs, monitoring portion sizes and being as active as medically feasible at this time. Fair to good compliance expected.    PROVIDER Section:     By signing this assessment you are acknowledging and agree with the diagnosis/diagnoses assigned by the Registered Dietitian    Comments:

## 2019-09-18 NOTE — DIETITIAN INITIAL EVALUATION ADULT. - OTHER INFO
Pt reports after delivery her appetite and intake were fair, had dipped a little before coming in due to having fevers but otherwise she was in general eating well. Pt reports no specific diet restrictions at home after delivery.     Pt reports NKFA. States she was taking a pre- multivitamin at home.     Reports she had been pumping but had decided to only formula feed  at this time.     Pt confirms h/o GDM, was on Metformin, reports she is aware of need to follow up c doctor for glucose tolerance test, reinforced test should be performed 4-12 weeks after delivery.     Pt reports wt of 240-250 pounds prior to pregnancy, reports she did not gain any wt during pregnancy, reports she lost wt due to healthy diet changes for her GDM diagnosis. Pt reports wt right before admission of about 228 pounds.

## 2019-09-18 NOTE — DIETITIAN INITIAL EVALUATION ADULT. - REASON INDICATOR FOR ASSESSMENT
Pt seen for LOS on [facility]  Source: pt and RN    Pt S/P LTCS on 8/30, admitted c fevers, chilld, vaginal discharge, pt c post-partum endometriosis and seroma, S/P IR aspiration, on antibiotics.

## 2019-09-19 ENCOUNTER — OTHER (OUTPATIENT)
Age: 29
End: 2019-09-19

## 2019-09-19 LAB
BASOPHILS # BLD AUTO: 0 K/UL — SIGNIFICANT CHANGE UP (ref 0–0.2)
BASOPHILS NFR BLD AUTO: 0.5 % — SIGNIFICANT CHANGE UP (ref 0–2)
EOSINOPHIL # BLD AUTO: 0.2 K/UL — SIGNIFICANT CHANGE UP (ref 0–0.5)
EOSINOPHIL NFR BLD AUTO: 3.4 % — SIGNIFICANT CHANGE UP (ref 0–6)
HCT VFR BLD CALC: 28.7 % — LOW (ref 34.5–45)
HGB BLD-MCNC: 8.9 G/DL — LOW (ref 11.5–15.5)
LYMPHOCYTES # BLD AUTO: 2 K/UL — SIGNIFICANT CHANGE UP (ref 1–3.3)
LYMPHOCYTES # BLD AUTO: 32.8 % — SIGNIFICANT CHANGE UP (ref 13–44)
MCHC RBC-ENTMCNC: 26.8 PG — LOW (ref 27–34)
MCHC RBC-ENTMCNC: 30.9 GM/DL — LOW (ref 32–36)
MCV RBC AUTO: 86.8 FL — SIGNIFICANT CHANGE UP (ref 80–100)
MONOCYTES # BLD AUTO: 0.5 K/UL — SIGNIFICANT CHANGE UP (ref 0–0.9)
MONOCYTES NFR BLD AUTO: 8.1 % — SIGNIFICANT CHANGE UP (ref 2–14)
NEUTROPHILS # BLD AUTO: 3.3 K/UL — SIGNIFICANT CHANGE UP (ref 1.8–7.4)
NEUTROPHILS NFR BLD AUTO: 55.2 % — SIGNIFICANT CHANGE UP (ref 43–77)
PLATELET # BLD AUTO: 614 K/UL — HIGH (ref 150–400)
RBC # BLD: 3.31 M/UL — LOW (ref 3.8–5.2)
RBC # FLD: 15.2 % — HIGH (ref 10.3–14.5)
WBC # BLD: 6 K/UL — SIGNIFICANT CHANGE UP (ref 3.8–10.5)
WBC # FLD AUTO: 6 K/UL — SIGNIFICANT CHANGE UP (ref 3.8–10.5)

## 2019-09-19 PROCEDURE — 99232 SBSQ HOSP IP/OBS MODERATE 35: CPT

## 2019-09-19 PROCEDURE — 99231 SBSQ HOSP IP/OBS SF/LOW 25: CPT

## 2019-09-19 PROCEDURE — 49423 EXCHANGE DRAINAGE CATHETER: CPT | Mod: 58

## 2019-09-19 PROCEDURE — 75984 XRAY CONTROL CATHETER CHANGE: CPT | Mod: 26

## 2019-09-19 PROCEDURE — 10030 IMG GID FLU COLL DRG SFT TIS: CPT | Mod: 58

## 2019-09-19 RX ADMIN — Medication 100 MILLIGRAM(S): at 18:57

## 2019-09-19 RX ADMIN — Medication 975 MILLIGRAM(S): at 06:28

## 2019-09-19 RX ADMIN — Medication 975 MILLIGRAM(S): at 19:50

## 2019-09-19 RX ADMIN — OXYCODONE HYDROCHLORIDE 5 MILLIGRAM(S): 5 TABLET ORAL at 09:57

## 2019-09-19 RX ADMIN — OXYCODONE HYDROCHLORIDE 5 MILLIGRAM(S): 5 TABLET ORAL at 10:35

## 2019-09-19 RX ADMIN — Medication 975 MILLIGRAM(S): at 01:00

## 2019-09-19 RX ADMIN — Medication 600 MILLIGRAM(S): at 22:10

## 2019-09-19 RX ADMIN — HEPARIN SODIUM 5000 UNIT(S): 5000 INJECTION INTRAVENOUS; SUBCUTANEOUS at 19:49

## 2019-09-19 RX ADMIN — Medication 600 MILLIGRAM(S): at 15:56

## 2019-09-19 RX ADMIN — Medication 600 MILLIGRAM(S): at 22:41

## 2019-09-19 RX ADMIN — PIPERACILLIN AND TAZOBACTAM 25 GRAM(S): 4; .5 INJECTION, POWDER, LYOPHILIZED, FOR SOLUTION INTRAVENOUS at 22:10

## 2019-09-19 RX ADMIN — PIPERACILLIN AND TAZOBACTAM 25 GRAM(S): 4; .5 INJECTION, POWDER, LYOPHILIZED, FOR SOLUTION INTRAVENOUS at 06:29

## 2019-09-19 RX ADMIN — Medication 975 MILLIGRAM(S): at 20:45

## 2019-09-19 RX ADMIN — OXYCODONE HYDROCHLORIDE 5 MILLIGRAM(S): 5 TABLET ORAL at 16:15

## 2019-09-19 RX ADMIN — OXYCODONE HYDROCHLORIDE 5 MILLIGRAM(S): 5 TABLET ORAL at 20:45

## 2019-09-19 RX ADMIN — Medication 100 MILLIGRAM(S): at 06:28

## 2019-09-19 RX ADMIN — Medication 975 MILLIGRAM(S): at 00:26

## 2019-09-19 RX ADMIN — OXYCODONE HYDROCHLORIDE 5 MILLIGRAM(S): 5 TABLET ORAL at 04:00

## 2019-09-19 RX ADMIN — OXYCODONE HYDROCHLORIDE 5 MILLIGRAM(S): 5 TABLET ORAL at 03:20

## 2019-09-19 RX ADMIN — HEPARIN SODIUM 5000 UNIT(S): 5000 INJECTION INTRAVENOUS; SUBCUTANEOUS at 09:58

## 2019-09-19 RX ADMIN — OXYCODONE HYDROCHLORIDE 5 MILLIGRAM(S): 5 TABLET ORAL at 19:49

## 2019-09-19 RX ADMIN — Medication 600 MILLIGRAM(S): at 16:35

## 2019-09-19 RX ADMIN — OXYCODONE HYDROCHLORIDE 5 MILLIGRAM(S): 5 TABLET ORAL at 15:26

## 2019-09-19 RX ADMIN — PIPERACILLIN AND TAZOBACTAM 25 GRAM(S): 4; .5 INJECTION, POWDER, LYOPHILIZED, FOR SOLUTION INTRAVENOUS at 13:55

## 2019-09-19 NOTE — PROGRESS NOTE ADULT - SUBJECTIVE AND OBJECTIVE BOX
Interventional Radiology Brief-Op Note    Pre-op diagnosis: RLQ abdominal wall collection    Post op diagnosis: same as above    Indication: Leakage at drain site    Procedure: RLQ drain evaluation and upsizing    Physician: Kellie  Assistant: Kelly Chen PA-C    Anesthesia (type): 2% lidocaine    Contrast: 9cc    EBL: none    Findings/Follow up Plan of Care: Drain evaluation demonstrates small residual cavity. Leakage at site noted upon contrast injection. There is wound dehiscence at drain insertion site noted. Drain upsized to 12Fr MPD under fluoroscopic guidance. Drain sutured in place. Dressing applied and drain reconnected to DUSTIN. Full report to follow.   -Stop flushes  -Continue to monitor output  -Given wound dehiscence at drain site, leakage may occur. Change dressing as needed  -Recommend repeat tube check once output decreases to <10cc/24hr    Specimens Removed: none    Implants: 12Fr MPD in RLQ     Complications: none    Condition/Disposition: Stable back to floor    Please call Interventional Radiology x [phone] with any questions, concerns, or issues.

## 2019-09-19 NOTE — PROGRESS NOTE ADULT - SUBJECTIVE AND OBJECTIVE BOX
Interventional Radiology Follow- Up Note      28y Female with hx of  with abdominal wall fluid collection s/p drainage on   in Interventional Radiology with Dr. Wolff. Reports significant improvement in abdominal pain. Denies fever.       Reports soreness at the drain site.   Reports that the dressing was changed this morning.    Vitals: T(F): 98.1 (19 @ 09:23), Max: 98.1 (19 @ 13:07)  HR: 79 (19 @ 09:23) (75 - 87)  BP: 99/70 (19 @ 09:23) (90/60 - 107/76)  RR: 18 (19 @ 09:23) (18 - 18)  SpO2: 98% (19 @ 09:23) (97% - 98%)  Wt(kg): --    LABS:                        8.9    6.0   )-----------( 614      ( 19 Sep 2019 07:18 )             28.7       PHYSICAL EXAM:  General: Nontoxic, in NAD  Neuro:  Alert & oriented x 3  Abdomen: soft, Distended abdomen. Dressing noted to be saturated. Dressing changed. Leaking with flushing noted. 100c/24hrs out put.  Serosanguinous output in the bulb.    Impression: 28y Female with hx of  with abdominal wall fluid collection s/p drainage on  - with leakage of fluid around the insertion site.     Plan:  - Planning for tube check and possible change.   - Antibiotics per team.  - continue to monitor out put.  - Flush drain per doctor orders.  - trend vitals, labs.  - D/w Dr. Hopkins.      Please call IR at extension [phone] or [phone] with any questions, concerns, or issues regarding above. Interventional Radiology Follow- Up Note      28y Female with hx of  with abdominal wall fluid collection s/p drainage on   in Interventional Radiology with Dr. Wolff. Reports significant improvement in abdominal pain. Denies fever.       Reports soreness at the drain site.   Reports that the dressing was changed this morning.    Vitals: T(F): 98.1 (19 @ 09:23), Max: 98.1 (19 @ 13:07)  HR: 79 (19 @ 09:23) (75 - 87)  BP: 99/70 (19 @ 09:23) (90/60 - 107/76)  RR: 18 (19 @ 09:23) (18 - 18)  SpO2: 98% (19 @ 09:23) (97% - 98%)  Wt(kg): --    LABS:                        8.9    6.0   )-----------( 614      ( 19 Sep 2019 07:18 )             28.7       PHYSICAL EXAM:  General: Nontoxic, in NAD  Neuro:  Alert & oriented x 3  Abdomen: soft, Distended abdomen. Dressing noted to be saturated. Dressing changed. Leaking with flushing noted. 100c/24hrs out put.  Serosanguinous output in the bulb.    Impression: 28y Female with hx of  with abdominal wall fluid collection s/p drainage on  - with leakage of fluid around the insertion site.     Plan:  - Planning for tube check and possible change.   - Antibiotics per team.  - continue to monitor out put.  - Flush drain per doctor orders.  - trend vitals, labs.  - D/w OB resident   - D/w Dr. Hopkins.      Please call IR at extension [phone] or [phone] with any questions, concerns, or issues regarding above.

## 2019-09-19 NOTE — PROGRESS NOTE ADULT - ASSESSMENT
20 yo  postpartum female s/p pLTCS () for arrest of descent and NRFHT presents with fevers/chills at home with fevers to 102, found to have abd fluid collections (post-op vs abscess), cellulitis of SSI and possible endometritis.     Overall sepsis, resolved fever, leucocytosis resolved, abdominal wall cellulitis and abscess.       Plan:   - continue with zosyn 3.375g IV q8h, gm stain with GPC in pairs.   - follow up abd fluid cultures,   - follow up BCx- neg to date

## 2019-09-19 NOTE — PROGRESS NOTE ADULT - ASSESSMENT
A/P: 27yo s/p pLTCS 8/30, a/w postpartum endometritis on 9/12, now s/p IR aspiration 9/13 (35cc serous fluid), HD#8 POD#20, stable. Patient went to IR 9/17 for drainage of 95cc brown cloudy serous drainage with RLQ DUSTIN drain in place, in stable condition.     Neuro: c/w PO tylenol   CV: Hemodynamically stable. AM CBC w/ diff pending.   ID: Continues on zosyn, now s/p vancomycin (9/15-9/18), gentamicin and clindamycin (started 9/12). Final blood culture (9/12) NGTD, urine culture (9/12) w < 10,000 CFU. Culture from IR drain (9/13) gram stain w/ PMNs but final no growth. IR drain culture (9/17) w/ rare gram positive cocci in pairs. Continue to trend WBC, f/u AM labs, continue to trend fever curve. Appreciate ID consult. 	  Pulm: Saturating well on room air, encourage incentive spirometry  GI: Regular diet   : voiding spontaneously  Heme: c/w SCDs and HSQ for DVT ppx.   Dispo: Continue routine care, continue IV antibiotics    CARLITA Palacio PGY3 A/P: 29yo s/p pLTCS 8/30, a/w postpartum endometritis on 9/12, now s/p IR aspiration 9/13 (35cc serous fluid), HD#8 POD#20, stable. Patient went to IR 9/17 for drainage of 95cc brown cloudy serous drainage with RLQ DUSTIN drain in place, in stable condition.     Neuro: c/w PO tylenol   CV: Hemodynamically stable. AM CBC w/ diff pending.   ID: Continues on zosyn, now s/p vancomycin (9/15-9/18), gentamicin and clindamycin (9/12-9/15). Final blood culture (9/12) NGTD, urine culture (9/12) w < 10,000 CFU. Culture from IR drain (9/13) gram stain w/ PMNs but final no growth. IR drain culture (9/17) w/ rare gram positive cocci in pairs. Continue to trend WBC, f/u AM labs, continue to trend fever curve. Appreciate ID consult. 	  Pulm: Saturating well on room air, encourage incentive spirometry  GI: Regular diet   : voiding spontaneously  Heme: c/w SCDs and HSQ for DVT ppx.   Dispo: Continue routine care, continue IV antibiotics    CARLITA Palacio PGY3

## 2019-09-19 NOTE — PROGRESS NOTE ADULT - SUBJECTIVE AND OBJECTIVE BOX
R3 OB Postpartum Note   HD#8, POD#20    S: 27yo  POD#20 s/ pLTCS now HD#8 a/w postpartum endometritis and seroma s/p IR aspiration on . Patient went back to IR  and had 95cc brown cloudy serous fluid drained with RLQ drain left in place.     Patient seen and examined at bedside, no acute overnight events. Reports improvement in incisional discomfort. Denies any HA, blurry vision, lightheadedness, CP/SOB, N/V. Patient is ambulating, voiding spontaneously, passing flatus, and tolerating regular diet.     Vital Signs Last 24 Hours  T(C): 36.6 (19 @ 00:42), Max: 36.7 (19 @ 13:07)  HR: 80 (19 @ 00:42) (70 - 87)  BP: 90/60 (19 @ 00:42) (90/60 - 107/76)  RR: 18 (19 @ 00:42) (18 - 18)  SpO2: 98% (19 @ 00:42) (96% - 98%)    Physical Exam:  General: NAD  CV: RRR  Pulm: CTAB  Abdomen: Soft, tender on right aspect of incision, non-distended  Incision: Pfannenstiel incision CDI, erythematous and indurated on right aspect of incision with fullness palpated and tenderness; RLQ DUSTIN drain with serous brown cloudy drainage  Extremities: no calf tenderness noted on exam    Labs:    Blood Type: O Negative  Antibody Screen: Negative  RPR: Negative               8.7    13.9  )-----------( 605      (  @ 06:13 )             26.9                9.0    19.7  )-----------( 720      (  @ 07:30 )             29.4                8.6    16.9  )-----------( 655      (  @ 06:09 )             28.0         MEDICATIONS  (STANDING):  docusate sodium 100 milliGRAM(s) Oral two times a day  heparin  Injectable 5000 Unit(s) SubCutaneous every 12 hours  lactated ringers. 1000 milliLiter(s) (125 mL/Hr) IV Continuous <Continuous>  piperacillin/tazobactam IVPB.. 3.375 Gram(s) IV Intermittent every 8 hours    MEDICATIONS  (PRN):  acetaminophen   Tablet .. 975 milliGRAM(s) Oral every 6 hours PRN Moderate Pain (4 - 6)  ibuprofen  Tablet. 600 milliGRAM(s) Oral every 6 hours PRN Moderate Pain (4 - 6)  oxyCODONE    IR 5 milliGRAM(s) Oral every 4 hours PRN Severe Pain (7 - 10) R3 OB Postpartum Note   HD#8, POD#20    S: 29yo  POD#20 s/ pLTCS now HD#8 a/w postpartum endometritis and seroma s/p IR aspiration on . Patient went back to IR  and had 95cc brown cloudy serous fluid drained with RLQ drain left in place.     Patient seen and examined at bedside, no acute overnight events. Reports improvement in incisional discomfort. Denies any HA, blurry vision, lightheadedness, CP/SOB, N/V. Patient is ambulating, voiding spontaneously, passing flatus, and tolerating regular diet.     Vital Signs Last 24 Hours  T(C): 36.6 (19 @ 00:42), Max: 36.7 (19 @ 13:07)  HR: 80 (19 @ 00:42) (70 - 87)  BP: 90/60 (19 @ 00:42) (90/60 - 107/76)  RR: 18 (19 @ 00:42) (18 - 18)  SpO2: 98% (19 @ 00:42) (96% - 98%)    DUSTIN: 35cc overnight     Physical Exam:  General: NAD  CV: RRR  Pulm: CTAB  Abdomen: Soft, tender on right aspect of incision, non-distended  Incision: Pfannenstiel incision CDI, remains erythematous and indurated on right aspect of incision with fullness palpated, however,improvement and tenderness; RLQ DUSTIN drain with serous drainage   Extremities: no calf tenderness noted on exam    Labs:    Blood Type: O Negative  Antibody Screen: Negative  RPR: Negative               8.7    13.9  )-----------( 605      (  @ 06:13 )             26.9                9.0    19.7  )-----------( 720      (  @ 07:30 )             29.4                8.6    16.9  )-----------( 655      (  @ 06:09 )             28.0         MEDICATIONS  (STANDING):  docusate sodium 100 milliGRAM(s) Oral two times a day  heparin  Injectable 5000 Unit(s) SubCutaneous every 12 hours  lactated ringers. 1000 milliLiter(s) (125 mL/Hr) IV Continuous <Continuous>  piperacillin/tazobactam IVPB.. 3.375 Gram(s) IV Intermittent every 8 hours    MEDICATIONS  (PRN):  acetaminophen   Tablet .. 975 milliGRAM(s) Oral every 6 hours PRN Moderate Pain (4 - 6)  ibuprofen  Tablet. 600 milliGRAM(s) Oral every 6 hours PRN Moderate Pain (4 - 6)  oxyCODONE    IR 5 milliGRAM(s) Oral every 4 hours PRN Severe Pain (7 - 10) R3 OB Postpartum Note   HD#8, POD#20    S: 29yo  POD#20 s/ pLTCS now HD#8 a/w postpartum endometritis and seroma s/p IR aspiration on . Patient went back to IR  and had 95cc brown cloudy serous fluid drained with RLQ drain left in place.     Patient seen and examined at bedside, no acute overnight events. Reports improvement in incisional discomfort. Denies any HA, blurry vision, lightheadedness, CP/SOB, N/V. Patient is ambulating, voiding spontaneously, passing flatus, and tolerating regular diet.     Vital Signs Last 24 Hours  T(C): 36.6 (19 @ 00:42), Max: 36.7 (19 @ 13:07)  HR: 80 (19 @ 00:42) (70 - 87)  BP: 90/60 (19 @ 00:42) (90/60 - 107/76)  RR: 18 (19 @ 00:42) (18 - 18)  SpO2: 98% (19 @ 00:42) (96% - 98%)    DUSTIN: 35cc overnight     Physical Exam:  General: NAD  CV: RRR  Pulm: CTAB  Abdomen: Soft, tender on right aspect of incision, non-distended  Incision: Pfannenstiel incision CDI, remains erythematous and indurated on right aspect of incision with fullness palpated, however, improvement and tenderness; RLQ DUSTIN drain with serous drainage, site w/ dressing C/D/I  Extremities: no calf tenderness noted on exam    Labs:    Blood Type: O Negative  Antibody Screen: Negative  RPR: Negative               8.7    13.9  )-----------( 605      (  @ 06:13 )             26.9                9.0    19.7  )-----------( 720      (  @ 07:30 )             29.4                8.6    16.9  )-----------( 655      (  @ 06:09 )             28.0         MEDICATIONS  (STANDING):  docusate sodium 100 milliGRAM(s) Oral two times a day  heparin  Injectable 5000 Unit(s) SubCutaneous every 12 hours  lactated ringers. 1000 milliLiter(s) (125 mL/Hr) IV Continuous <Continuous>  piperacillin/tazobactam IVPB.. 3.375 Gram(s) IV Intermittent every 8 hours    MEDICATIONS  (PRN):  acetaminophen   Tablet .. 975 milliGRAM(s) Oral every 6 hours PRN Moderate Pain (4 - 6)  ibuprofen  Tablet. 600 milliGRAM(s) Oral every 6 hours PRN Moderate Pain (4 - 6)  oxyCODONE    IR 5 milliGRAM(s) Oral every 4 hours PRN Severe Pain (7 - 10)

## 2019-09-19 NOTE — PROGRESS NOTE ADULT - SUBJECTIVE AND OBJECTIVE BOX
CC: Patient is a 28y old  Female who presents with a chief complaint of fever    ID following for abd wall abscess    Interval History/ROS: Patient feeling better. s/p upsize of RLQ drain today with IR. No fevers, no chills.    Rest of ROS negative.    Allergies  No Known Allergies    ANTIMICROBIALS:  piperacillin/tazobactam IVPB.. 3.375 every 8 hours    OTHER MEDS:  acetaminophen   Tablet .. 975 milliGRAM(s) Oral every 6 hours PRN  docusate sodium 100 milliGRAM(s) Oral two times a day  heparin  Injectable 5000 Unit(s) SubCutaneous every 12 hours  ibuprofen  Tablet. 600 milliGRAM(s) Oral every 6 hours PRN  lactated ringers. 1000 milliLiter(s) IV Continuous <Continuous>  oxyCODONE    IR 5 milliGRAM(s) Oral every 4 hours PRN    PE:    Vital Signs Last 24 Hrs  T(C): 36.8 (19 Sep 2019 15:00), Max: 36.9 (19 Sep 2019 12:37)  T(F): 98.2 (19 Sep 2019 15:00), Max: 98.4 (19 Sep 2019 12:37)  HR: 73 (19 Sep 2019 15:00) (73 - 87)  BP: 99/58 (19 Sep 2019 15:00) (90/60 - 107/76)  BP(mean): --  RR: 18 (19 Sep 2019 15:00) (18 - 18)  SpO2: 98% (19 Sep 2019 15:00) (97% - 98%)    Gen: AOx3, NAD, non-toxic, pleasant  CV: S1+S2 normal, no murmurs  Resp: Clear bilat, no resp distress  Abd: Soft, nontender, +BS, RLQ drain in place with serosanguinous drainage,  site intact on visible portion  Ext: No LE edema, no wounds  : No Mendez  IV/Skin: No thrombophlebitis  Neuro: no focal deficits    LABS:                          8.9    6.0   )-----------( 614      ( 19 Sep 2019 07:18 )             28.7       MICROBIOLOGY:  v  .Blood  19   No growth to date.  --  --      .Body Fluid abdominal abscess  19   Culture in progress  --    Numerous polymorphonuclear leukocytes per low power field  Rare Gram positive cocci in pairs per oil power field      Abdominal Fl abdominal wall fluid collectio  19   No growth at 5 days  --    polymorphonuclear leukocytes seen  No organisms seen  by cytocentrifuge      .Urine  19   <10,000 CFU/mL Normal Urogenital Suzanne  --  --      .Blood  19   No growth at 5 days.  --  --    RADIOLOGY:    < from: CT Abdomen and Pelvis No Cont (19 @ 09:20) >  IMPRESSION:     Complex collection in the lower anterior abdominal subcutaneous tissues   extending to the right of midline, increased in size since 2019.  An   infected collection cannot be excluded.    Small collection along the incision site in the lower uterine segment,   decreased in size.    Diffuse hepatic steatosis.      < end of copied text >

## 2019-09-20 ENCOUNTER — TRANSCRIPTION ENCOUNTER (OUTPATIENT)
Age: 29
End: 2019-09-20

## 2019-09-20 VITALS
TEMPERATURE: 98 F | HEART RATE: 64 BPM | DIASTOLIC BLOOD PRESSURE: 60 MMHG | OXYGEN SATURATION: 98 % | SYSTOLIC BLOOD PRESSURE: 91 MMHG | RESPIRATION RATE: 18 BRPM

## 2019-09-20 LAB
-  AMIKACIN: SIGNIFICANT CHANGE UP
-  AMOXICILLIN/CLAVULANIC ACID: SIGNIFICANT CHANGE UP
-  AMPICILLIN/SULBACTAM: SIGNIFICANT CHANGE UP
-  AMPICILLIN: SIGNIFICANT CHANGE UP
-  AZTREONAM: SIGNIFICANT CHANGE UP
-  CEFAZOLIN: SIGNIFICANT CHANGE UP
-  CEFEPIME: SIGNIFICANT CHANGE UP
-  CEFOXITIN: SIGNIFICANT CHANGE UP
-  CEFTRIAXONE: SIGNIFICANT CHANGE UP
-  CIPROFLOXACIN: SIGNIFICANT CHANGE UP
-  ERTAPENEM: SIGNIFICANT CHANGE UP
-  GENTAMICIN: SIGNIFICANT CHANGE UP
-  LEVOFLOXACIN: SIGNIFICANT CHANGE UP
-  MEROPENEM: SIGNIFICANT CHANGE UP
-  PIPERACILLIN/TAZOBACTAM: SIGNIFICANT CHANGE UP
-  TOBRAMYCIN: SIGNIFICANT CHANGE UP
-  TRIMETHOPRIM/SULFAMETHOXAZOLE: SIGNIFICANT CHANGE UP
BASOPHILS # BLD AUTO: 0 K/UL — SIGNIFICANT CHANGE UP (ref 0–0.2)
BASOPHILS NFR BLD AUTO: 0.3 % — SIGNIFICANT CHANGE UP (ref 0–2)
EOSINOPHIL # BLD AUTO: 0.2 K/UL — SIGNIFICANT CHANGE UP (ref 0–0.5)
EOSINOPHIL NFR BLD AUTO: 3.8 % — SIGNIFICANT CHANGE UP (ref 0–6)
GRAM STN FLD: SIGNIFICANT CHANGE UP
HCT VFR BLD CALC: 29 % — LOW (ref 34.5–45)
HGB BLD-MCNC: 9.3 G/DL — LOW (ref 11.5–15.5)
LYMPHOCYTES # BLD AUTO: 2.1 K/UL — SIGNIFICANT CHANGE UP (ref 1–3.3)
LYMPHOCYTES # BLD AUTO: 36.6 % — SIGNIFICANT CHANGE UP (ref 13–44)
MCHC RBC-ENTMCNC: 27.5 PG — SIGNIFICANT CHANGE UP (ref 27–34)
MCHC RBC-ENTMCNC: 32.1 GM/DL — SIGNIFICANT CHANGE UP (ref 32–36)
MCV RBC AUTO: 85.6 FL — SIGNIFICANT CHANGE UP (ref 80–100)
METHOD TYPE: SIGNIFICANT CHANGE UP
MONOCYTES # BLD AUTO: 0.4 K/UL — SIGNIFICANT CHANGE UP (ref 0–0.9)
MONOCYTES NFR BLD AUTO: 7.5 % — SIGNIFICANT CHANGE UP (ref 2–14)
NEUTROPHILS # BLD AUTO: 3 K/UL — SIGNIFICANT CHANGE UP (ref 1.8–7.4)
NEUTROPHILS NFR BLD AUTO: 51.9 % — SIGNIFICANT CHANGE UP (ref 43–77)
PLATELET # BLD AUTO: 633 K/UL — HIGH (ref 150–400)
RBC # BLD: 3.39 M/UL — LOW (ref 3.8–5.2)
RBC # FLD: 15.1 % — HIGH (ref 10.3–14.5)
WBC # BLD: 5.8 K/UL — SIGNIFICANT CHANGE UP (ref 3.8–10.5)
WBC # FLD AUTO: 5.8 K/UL — SIGNIFICANT CHANGE UP (ref 3.8–10.5)

## 2019-09-20 PROCEDURE — 84132 ASSAY OF SERUM POTASSIUM: CPT

## 2019-09-20 PROCEDURE — 96374 THER/PROPH/DIAG INJ IV PUSH: CPT | Mod: XU

## 2019-09-20 PROCEDURE — 87040 BLOOD CULTURE FOR BACTERIA: CPT

## 2019-09-20 PROCEDURE — 85610 PROTHROMBIN TIME: CPT

## 2019-09-20 PROCEDURE — 83735 ASSAY OF MAGNESIUM: CPT

## 2019-09-20 PROCEDURE — C1729: CPT

## 2019-09-20 PROCEDURE — 10160 PNXR ASPIR ABSC HMTMA BULLA: CPT

## 2019-09-20 PROCEDURE — C1769: CPT

## 2019-09-20 PROCEDURE — 76942 ECHO GUIDE FOR BIOPSY: CPT

## 2019-09-20 PROCEDURE — 87186 SC STD MICRODIL/AGAR DIL: CPT

## 2019-09-20 PROCEDURE — 82565 ASSAY OF CREATININE: CPT

## 2019-09-20 PROCEDURE — 96375 TX/PRO/DX INJ NEW DRUG ADDON: CPT | Mod: XU

## 2019-09-20 PROCEDURE — 85027 COMPLETE CBC AUTOMATED: CPT

## 2019-09-20 PROCEDURE — 82947 ASSAY GLUCOSE BLOOD QUANT: CPT

## 2019-09-20 PROCEDURE — 84484 ASSAY OF TROPONIN QUANT: CPT

## 2019-09-20 PROCEDURE — 99231 SBSQ HOSP IP/OBS SF/LOW 25: CPT

## 2019-09-20 PROCEDURE — 10030 IMG GID FLU COLL DRG SFT TIS: CPT

## 2019-09-20 PROCEDURE — 71045 X-RAY EXAM CHEST 1 VIEW: CPT

## 2019-09-20 PROCEDURE — 80053 COMPREHEN METABOLIC PANEL: CPT

## 2019-09-20 PROCEDURE — 74176 CT ABD & PELVIS W/O CONTRAST: CPT

## 2019-09-20 PROCEDURE — 93005 ELECTROCARDIOGRAM TRACING: CPT

## 2019-09-20 PROCEDURE — 49423 EXCHANGE DRAINAGE CATHETER: CPT

## 2019-09-20 PROCEDURE — 87070 CULTURE OTHR SPECIMN AEROBIC: CPT

## 2019-09-20 PROCEDURE — 87205 SMEAR GRAM STAIN: CPT

## 2019-09-20 PROCEDURE — 85730 THROMBOPLASTIN TIME PARTIAL: CPT

## 2019-09-20 PROCEDURE — 81001 URINALYSIS AUTO W/SCOPE: CPT

## 2019-09-20 PROCEDURE — 74177 CT ABD & PELVIS W/CONTRAST: CPT

## 2019-09-20 PROCEDURE — 85014 HEMATOCRIT: CPT

## 2019-09-20 PROCEDURE — 87075 CULTR BACTERIA EXCEPT BLOOD: CPT

## 2019-09-20 PROCEDURE — 80048 BASIC METABOLIC PNL TOTAL CA: CPT

## 2019-09-20 PROCEDURE — 99233 SBSQ HOSP IP/OBS HIGH 50: CPT

## 2019-09-20 PROCEDURE — 99285 EMERGENCY DEPT VISIT HI MDM: CPT | Mod: 25

## 2019-09-20 PROCEDURE — 87086 URINE CULTURE/COLONY COUNT: CPT

## 2019-09-20 PROCEDURE — 82435 ASSAY OF BLOOD CHLORIDE: CPT

## 2019-09-20 PROCEDURE — 75984 XRAY CONTROL CATHETER CHANGE: CPT

## 2019-09-20 PROCEDURE — 83605 ASSAY OF LACTIC ACID: CPT

## 2019-09-20 PROCEDURE — 82803 BLOOD GASES ANY COMBINATION: CPT

## 2019-09-20 PROCEDURE — 82330 ASSAY OF CALCIUM: CPT

## 2019-09-20 PROCEDURE — 84295 ASSAY OF SERUM SODIUM: CPT

## 2019-09-20 RX ORDER — DOCUSATE SODIUM 100 MG
1 CAPSULE ORAL
Qty: 0 | Refills: 0 | DISCHARGE
Start: 2019-09-20

## 2019-09-20 RX ORDER — CEFDINIR 250 MG/5ML
1 POWDER, FOR SUSPENSION ORAL
Qty: 14 | Refills: 0
Start: 2019-09-20 | End: 2019-09-26

## 2019-09-20 RX ORDER — METRONIDAZOLE 500 MG
1 TABLET ORAL
Qty: 14 | Refills: 0
Start: 2019-09-20 | End: 2019-09-26

## 2019-09-20 RX ADMIN — Medication 975 MILLIGRAM(S): at 02:41

## 2019-09-20 RX ADMIN — OXYCODONE HYDROCHLORIDE 5 MILLIGRAM(S): 5 TABLET ORAL at 00:19

## 2019-09-20 RX ADMIN — OXYCODONE HYDROCHLORIDE 5 MILLIGRAM(S): 5 TABLET ORAL at 04:21

## 2019-09-20 RX ADMIN — Medication 600 MILLIGRAM(S): at 05:03

## 2019-09-20 RX ADMIN — Medication 600 MILLIGRAM(S): at 10:37

## 2019-09-20 RX ADMIN — Medication 600 MILLIGRAM(S): at 17:49

## 2019-09-20 RX ADMIN — Medication 975 MILLIGRAM(S): at 03:27

## 2019-09-20 RX ADMIN — Medication 975 MILLIGRAM(S): at 14:29

## 2019-09-20 RX ADMIN — Medication 975 MILLIGRAM(S): at 14:59

## 2019-09-20 RX ADMIN — PIPERACILLIN AND TAZOBACTAM 25 GRAM(S): 4; .5 INJECTION, POWDER, LYOPHILIZED, FOR SOLUTION INTRAVENOUS at 13:40

## 2019-09-20 RX ADMIN — Medication 600 MILLIGRAM(S): at 10:07

## 2019-09-20 RX ADMIN — PIPERACILLIN AND TAZOBACTAM 25 GRAM(S): 4; .5 INJECTION, POWDER, LYOPHILIZED, FOR SOLUTION INTRAVENOUS at 05:58

## 2019-09-20 RX ADMIN — OXYCODONE HYDROCHLORIDE 5 MILLIGRAM(S): 5 TABLET ORAL at 08:21

## 2019-09-20 RX ADMIN — Medication 975 MILLIGRAM(S): at 08:20

## 2019-09-20 RX ADMIN — OXYCODONE HYDROCHLORIDE 5 MILLIGRAM(S): 5 TABLET ORAL at 12:44

## 2019-09-20 RX ADMIN — Medication 975 MILLIGRAM(S): at 08:50

## 2019-09-20 RX ADMIN — OXYCODONE HYDROCHLORIDE 5 MILLIGRAM(S): 5 TABLET ORAL at 13:14

## 2019-09-20 RX ADMIN — OXYCODONE HYDROCHLORIDE 5 MILLIGRAM(S): 5 TABLET ORAL at 05:03

## 2019-09-20 RX ADMIN — Medication 100 MILLIGRAM(S): at 17:49

## 2019-09-20 RX ADMIN — OXYCODONE HYDROCHLORIDE 5 MILLIGRAM(S): 5 TABLET ORAL at 00:49

## 2019-09-20 RX ADMIN — Medication 600 MILLIGRAM(S): at 04:21

## 2019-09-20 RX ADMIN — Medication 100 MILLIGRAM(S): at 05:58

## 2019-09-20 RX ADMIN — OXYCODONE HYDROCHLORIDE 5 MILLIGRAM(S): 5 TABLET ORAL at 08:51

## 2019-09-20 RX ADMIN — HEPARIN SODIUM 5000 UNIT(S): 5000 INJECTION INTRAVENOUS; SUBCUTANEOUS at 08:22

## 2019-09-20 NOTE — PROGRESS NOTE ADULT - ASSESSMENT
20 yo  postpartum female s/p pLTCS () for arrest of descent and NRFHT presents with fevers/chills at home with fevers to 102, found to have abd fluid collections (post-op vs abscess), cellulitis of SSI and possible endometritis.     Overall resolved sepsis, resolved fever and leucocytosis, abdominal wall cellulitis and abscess. Positive cx finding.       Plan:   - continue with zosyn 3.375g IV q8h, while inpt.   - discussed with pt, results of cx prelim, she really wants to go home, explained there is a risk of suboptimally treated infection in case of resistant bacteria, she understands and verbalizes the risk of worsening infection, sepsis, shock and willing to switch to po. Advised to seek medical care in case of worsening symptoms.   - follow up abd fluid cultures, discussed with OB resident will change abx over the weekend as needed based on cx.   - can switch to cefdinir 300 mg po bid with flagyl 500 mg po bid to complete therapy until 19.   - follow up BCx- neg to date

## 2019-09-20 NOTE — PROGRESS NOTE ADULT - SUBJECTIVE AND OBJECTIVE BOX
Interventional Radiology Follow- Up Note    28y Female with hx of  with abdominal wall fluid collection s/p drainage on  s/p upsizing on  for leaking tube. Denies abdominal pain.       Vitals: T(F): 98.1 (19 @ 12:24), Max: 98.6 (19 @ 08:12)  HR: 84 (19 @ 12:24) (69 - 87)  BP: 106/74 (19 @ 12:24) (90/63 - 110/65)  RR: 18 (19 @ 12:24) (18 - 18)  SpO2: 100% (19 @ 12:24) (98% - 100%)  Wt(kg): --    LABS:                        9.3    5.8   )-----------( 633      ( 20 Sep 2019 07:12 )             29.0     PHYSICAL EXAM:  General: Nontoxic, in NAD  Neuro:  Alert & oriented x 3  Abdomen: soft, obese abdomen. 10.2 french drain in place. Dressing with mild saturation of serosanguinous output. Dressing changed. 45cc/24hrs output.     Impression: 28y s/p abdominal wall fluid collection s/p drainage s/p upsizing.    Plan:  - Instructed pt to monitor output at home and record it.  - Dressing changes as needed.  - Follow up with us In one week for drain evaluation. Call to make appt at [phone].  - Will d/w IR attending.      Please call IR at extension [phone] or [phone] with any questions, concerns, or issues regarding above.

## 2019-09-20 NOTE — PROGRESS NOTE ADULT - PROVIDER SPECIALTY LIST ADULT
Infectious Disease
Intervent Radiology
OB

## 2019-09-20 NOTE — DISCHARGE NOTE PROVIDER - NSDCFUADDAPPT_GEN_ALL_CORE_FT
Please call the [facility] ([phone]) to make your follow-up appointment:     Follow-up in 2 weeks  [address], [address] [place] [address]  Phone: [phone]    When the output from your drain is less than 10 cc in 24 hours, call interventional radiology to schedule the drain removal.  [phone]

## 2019-09-20 NOTE — PROGRESS NOTE ADULT - ASSESSMENT
27yo  POD#21 s/ pLTCS now HD#8 a/w postpartum endometritis and seroma s/p IR aspiration on . Patient went back to IR  and had 95cc brown cloudy serous fluid drained with RLQ drain left in place.    Neuro: c/w PO tylenol   CV: Hemodynamically stable. AM CBC w/ diff pending.   ID: Continues on zosyn, now s/p vancomycin (9/15-), gentamicin and clindamycin (-9/15). Final blood culture () NGTD, urine culture () w < 10,000 CFU. Culture from IR drain () gram stain w/ PMNs but final no growth. IR drain culture () w/ rare gram positive cocci in pairs. Continue to trend WBC, f/u AM labs, continue to trend fever curve. Appreciate ID consult. Appreciate IR.   Pulm: Saturating well on room air, encourage incentive spirometry  GI: Regular diet   : voiding spontaneously  Heme: c/w SCDs and HSQ for DVT ppx.   Dispo: Continue routine care, continue IV antibiotics    CARLITA Palacio PGY3

## 2019-09-20 NOTE — DISCHARGE NOTE PROVIDER - NSDCCPCAREPLAN_GEN_ALL_CORE_FT
PRINCIPAL DISCHARGE DIAGNOSIS  Diagnosis: Incisional infection  Assessment and Plan of Treatment:       SECONDARY DISCHARGE DIAGNOSES  Diagnosis: Abdominal pain  Assessment and Plan of Treatment:     Diagnosis: Fever  Assessment and Plan of Treatment:

## 2019-09-20 NOTE — DISCHARGE NOTE NURSING/CASE MANAGEMENT/SOCIAL WORK - NSDCFUADDAPPT_GEN_ALL_CORE_FT
Please call the [facility] ([phone]) to make your follow-up appointment:     Follow-up in 2 weeks  [address] [place] [address]  Phone: [phone]    When the output from your drain is less than 10 cc in 24 hours, call interventional radiology to schedule the drain removal.  [phone]

## 2019-09-20 NOTE — PROGRESS NOTE ADULT - NSHPATTENDINGPLANDISCUSS_GEN_ALL_CORE
OB team
Radiology, Ob team
patient at bedside
resident and patient.

## 2019-09-20 NOTE — DISCHARGE NOTE NURSING/CASE MANAGEMENT/SOCIAL WORK - PATIENT PORTAL LINK FT
You can access the FollowMyHealth Patient Portal offered by [facility] by registering at the following website: [email or web address]. By joining [employer]’s FollowMyHealth portal, you will also be able to view your health information using other applications (apps) compatible with our system.

## 2019-09-20 NOTE — PROGRESS NOTE ADULT - ATTENDING COMMENTS
Emil Nath  Pager: [phone]. If no response or past 5 pm call [phone].
Emil Nath  Pager: [phone]. If no response or past 5 pm call [phone].
Emil Nath  Pager: [phone]. If no response or past 5 pm call [phone].
Jaswinder Ortega MD  Pager [phone]  After 5pm/weekends call [phone]
Patient seen and examined by me.  Agree with above resident note. Await ID note on change to po abx.
Patient seen and examined by me.  Agree with above resident note.
Patient seen and examined by me.  Agree with above resident note. Patient looks and feels better. On Zosyn. Discharge will be determined by ID when patient is changed over to po antibiotics and cleared for discharge.
Patient seen at bedside, agree with above assessment, Patient is status post low transverse  sherman on  readmitted with PP endometritis status post IR drainage of superficial collection , patient with most recent temp 445AM, WBC increasing today likely a result of IR drainage will continue to monitor, Patient states she feels well, states abd pain has improved, denies CP, SOB, ambulating. will continue HSQ, follow up bld/ucx, continue with daily CBC with diff, if another temperature today will obtain ID consult, broaden abx.   patient examined at bedside, abdomen soft, NT, ND, inc c/d/i with dressing over IR drainage, legs soft, no redness, swelling, breasts with no evidence of redness/induration, pain.   will continue current plan of care, patient in agreement
back note    s/p C/S 8/3 w readmit ffor presumed endometritis on antibiotics (abx), pt w repetitive fevers daily w finding of pelvic collection and superficial collection.     ICU Vital Signs Last 24 Hrs  T(C): 36.9 (17 Sep 2019 08:12), Max: 38.3 (17 Sep 2019 05:47)  HR: 94 (17 Sep 2019 08:12) (94 - 111)  BP: 107/68 (17 Sep 2019 08:12) (94/63 - 110/76)  RR: 16 (17 Sep 2019 08:12) (16 - 18)  SpO2: 97% (17 Sep 2019 08:12) (95% - 98%)               9.0    19.7  )-----------( 720      ( 17 Sep 2019 07:30 )             29.4     NAD  abd soft mildly tender    Pt for IR drain of superficial collection today  continue abx, ID input appreciated  Reviewed plan w pt and agreed w plan. Discussed alternative options if current plan not successful    Pt seen and evaluated by me. Agree w resident note

## 2019-09-20 NOTE — PROGRESS NOTE ADULT - SUBJECTIVE AND OBJECTIVE BOX
28y old  Female who presents with a chief complaint of postpartum endometritis (20 Sep 2019 14:24)      Interval history:  Afebrile, much better, wants to go home.     No Known Allergies      Antimicrobials:  piperacillin/tazobactam IVPB.. 3.375 Gram(s) IV Intermittent every 8 hours      REVIEW OF SYSTEMS:  No chest pain   No cough, no SOB  No N/V  No dysuria   No rash.     Vital Signs Last 24 Hrs  T(C): 36.8 (09-20-19 @ 17:20), Max: 37 (09-20-19 @ 08:12)  T(F): 98.2 (09-20-19 @ 17:20), Max: 98.6 (09-20-19 @ 08:12)  HR: 64 (09-20-19 @ 17:20) (64 - 87)  BP: 91/60 (09-20-19 @ 17:20) (90/63 - 110/65)  BP(mean): --  RR: 18 (09-20-19 @ 17:20) (18 - 18)  SpO2: 98% (09-20-19 @ 17:20) (98% - 100%)      PHYSICAL EXAM:  Patient in NAD, AAO X 3.   Cardiovascular: S1S2 normal,   Lungs: + air entry B/L lung fields.  Gastrointestinal: soft, + rt sided drain, lower abdominal some erythema with minimal warmth.    Extremities: no edema.  IV sites not inflamed.                           9.3    5.8   )-----------( 633      ( 20 Sep 2019 07:12 )             29.0       Culture - Blood (collected 17 Sep 2019 22:21)  Source: .Blood  Preliminary Report (18 Sep 2019 23:01):    No growth to date.    Culture - Blood (collected 17 Sep 2019 22:21)  Source: .Blood  Preliminary Report (18 Sep 2019 23:01):    No growth to date.    Culture - Body Fluid with Gram Stain (collected 17 Sep 2019 20:28)  Source: .Body Fluid abdominal abscess  Gram Stain (17 Sep 2019 23:02):    Numerous polymorphonuclear leukocytes per low power field    Rare Gram positive cocci in pairs per oil power field  Preliminary Report (19 Sep 2019 23:23):    Growth in fluid media only Serratia marcescens    Numerous Bacteroides fragilis "Susceptibilities not performed"

## 2019-09-20 NOTE — DISCHARGE NOTE PROVIDER - HOSPITAL COURSE
27 yo  POD# 19 admitted with suspected postpartum endometritis on 19. CT A/P showed 2 fluid collections along the incisions on the R and L sides. IV antibiotics were initiated. The L sided collected was drained by IR on HD#2, however the pt's leukocytosis progressively uptrended and pt continued to have fevers. ID was consulted who recommended IR drainage of the R incisional fluid collection-likely an abscess. Pt was drained by IR on HD#6. After the procedure, pt remained afebrile and leukocytosis downtrended. Cultures from the 2nd IR procedure grew Serratia and Bacteroides. Pt was transitioned to PO medication on HD# 9 and discharged home in stable condition. Pt will follow-up in GYN clinic in 2 weeks.

## 2019-09-20 NOTE — PROGRESS NOTE ADULT - SUBJECTIVE AND OBJECTIVE BOX
R3 OB Progress Note   HD#9, POD#21     S: 27yo  POD#21 s/ pLTCS now HD#8 a/w postpartum endometritis and seroma s/p IR aspiration on . Patient went back to IR  and had 95cc brown cloudy serous fluid drained with RLQ drain left in place.     Patient seen and examined at bedside, no acute overnight events. No acute complaints, pain well controlled. Denies any HA, blurry vision, lightheadedness, CP/SOB, N/V. Patient is ambulating, voiding spontaneously, passing flatus, and tolerating regular diet.     Vital Signs Last 24 Hours  T(C): 36.6 (19 @ 00:29), Max: 36.9 (19 @ 12:37)  HR: 85 (19 @ 00:29) (73 - 87)  BP: 91/62 (19 @ 00:29) (91/62 - 110/65)  RR: 18 (19 @ 00:29) (18 - 18)  SpO2: 98% (19 @ 00:29) (97% - 99%)    DUSTIN:     Physical Exam:  General: NAD  CV: RRR  Pulm: CTAB  Abdomen: Soft, tender on right aspect of incision, non-distended  Incision: Pfannenstiel incision CDI, remains erythematous on right aspect of incision with fullness lessened; RLQ DUSTIN drain with serous drainage, site w/ dressing C/D/I  Extremities: no calf tenderness noted on exam    Labs:    Blood Type: O Negative  Antibody Screen: Negative  RPR: Negative               8.9    6.0   )-----------( 614      (  @ 07:18 )             28.7                8.7    13.9  )-----------( 605      (  @ 06:13 )             26.9                9.0    19.7  )-----------( 720      (  @ 07:30 )             29.4         MEDICATIONS  (STANDING):  docusate sodium 100 milliGRAM(s) Oral two times a day  heparin  Injectable 5000 Unit(s) SubCutaneous every 12 hours  piperacillin/tazobactam IVPB.. 3.375 Gram(s) IV Intermittent every 8 hours    MEDICATIONS  (PRN):  acetaminophen   Tablet .. 975 milliGRAM(s) Oral every 6 hours PRN Moderate Pain (4 - 6)  ibuprofen  Tablet. 600 milliGRAM(s) Oral every 6 hours PRN Moderate Pain (4 - 6)  oxyCODONE    IR 5 milliGRAM(s) Oral every 4 hours PRN Severe Pain (7 - 10)

## 2019-09-22 LAB
CULTURE RESULTS: SIGNIFICANT CHANGE UP
ORGANISM # SPEC MICROSCOPIC CNT: SIGNIFICANT CHANGE UP
ORGANISM # SPEC MICROSCOPIC CNT: SIGNIFICANT CHANGE UP
SPECIMEN SOURCE: SIGNIFICANT CHANGE UP

## 2019-09-23 PROBLEM — O24.415 GESTATIONAL DIABETES MELLITUS IN PREGNANCY, CONTROLLED BY ORAL HYPOGLYCEMIC DRUGS: Chronic | Status: ACTIVE | Noted: 2019-09-13

## 2019-09-24 ENCOUNTER — FORM ENCOUNTER (OUTPATIENT)
Age: 29
End: 2019-09-24

## 2019-09-25 ENCOUNTER — APPOINTMENT (OUTPATIENT)
Dept: CT IMAGING | Facility: HOSPITAL | Age: 29
End: 2019-09-25

## 2019-09-25 ENCOUNTER — OUTPATIENT (OUTPATIENT)
Dept: OUTPATIENT SERVICES | Facility: HOSPITAL | Age: 29
LOS: 1 days | End: 2019-09-25
Payer: MEDICAID

## 2019-09-25 DIAGNOSIS — K65.1 PERITONEAL ABSCESS: ICD-10-CM

## 2019-09-25 DIAGNOSIS — Z98.891 HISTORY OF UTERINE SCAR FROM PREVIOUS SURGERY: Chronic | ICD-10-CM

## 2019-09-25 PROCEDURE — 74176 CT ABD & PELVIS W/O CONTRAST: CPT

## 2019-09-25 PROCEDURE — 74176 CT ABD & PELVIS W/O CONTRAST: CPT | Mod: 26

## 2019-09-25 PROCEDURE — 49424 ASSESS CYST CONTRAST INJECT: CPT

## 2019-09-25 PROCEDURE — 76080 X-RAY EXAM OF FISTULA: CPT

## 2019-09-25 PROCEDURE — 76080 X-RAY EXAM OF FISTULA: CPT | Mod: 26

## 2019-09-27 ENCOUNTER — OUTPATIENT (OUTPATIENT)
Dept: OUTPATIENT SERVICES | Facility: HOSPITAL | Age: 29
LOS: 1 days | End: 2019-09-27
Payer: MEDICAID

## 2019-09-27 ENCOUNTER — APPOINTMENT (OUTPATIENT)
Dept: MATERNAL FETAL MEDICINE | Facility: CLINIC | Age: 29
End: 2019-09-27
Payer: MEDICAID

## 2019-09-27 ENCOUNTER — NON-APPOINTMENT (OUTPATIENT)
Age: 29
End: 2019-09-27

## 2019-09-27 DIAGNOSIS — Z98.891 HISTORY OF UTERINE SCAR FROM PREVIOUS SURGERY: Chronic | ICD-10-CM

## 2019-09-27 DIAGNOSIS — O09.899 SUPERVISION OF OTHER HIGH RISK PREGNANCIES, UNSPECIFIED TRIMESTER: ICD-10-CM

## 2019-09-27 PROCEDURE — 99213 OFFICE O/P EST LOW 20 MIN: CPT | Mod: GE

## 2019-09-27 PROCEDURE — G0463: CPT

## 2019-09-29 NOTE — HISTORY OF PRESENT ILLNESS
[0/10] : no pain reported [Clean/Dry/Intact] : clean, dry and intact [Healed] : healed [Doing Well] : is doing well [None] : no vaginal bleeding [No Sign of Infection] : is showing no signs of infection [Fever] : no fever [Chills] : no chills [Erythema] : not erythematous [Dehiscence] : not dehisced [Swelling] : not swollen [de-identified] : incision with small opening where DUSTIN drain was in place, with serous fluid. No incisional erythema or induration.  [de-identified] : 29yo F POD 28 s/p pLTCS for arrest 8/30, c/b readmission for pelvic infection s/p IR drainage and IV antibiotics. She was discharged 9/20 on cefuroxime and Flagyl. DUSTIN drain removed 9/25. No complaints today [de-identified] : 27yo F POD 28 s/p pLTCS and readmission for pelvic infection, now with no signs of infection [de-identified] : RTC 2 weeks for PP visit and 2 hour GTT. Patient counseled on birth control, will consider options and rediscuss next week. d/w Dr. Spaulding and RON Saenz PGY-4

## 2019-09-29 NOTE — CHIEF COMPLAINT
[Post Op Day: ___] : post op day #[unfilled] [Procedure: ___] : status post [unfilled] [Indication: ___] : for [unfilled] [de-identified] : 27yo F s/p pLTCS for arrest, c/b readmission for pelvic infection s/p IR drainage and antibiotics.

## 2019-09-30 DIAGNOSIS — Z43.4 ENCOUNTER FOR ATTENTION TO OTHER ARTIFICIAL OPENINGS OF DIGESTIVE TRACT: ICD-10-CM

## 2019-10-03 DIAGNOSIS — Z98.891 HISTORY OF UTERINE SCAR FROM PREVIOUS SURGERY: ICD-10-CM

## 2019-10-04 DIAGNOSIS — O24.415 GESTATIONAL DIABETES MELLITUS IN PREGNANCY, CONTROLLED BY ORAL HYPOGLYCEMIC DRUGS: ICD-10-CM

## 2019-10-04 DIAGNOSIS — O99.213 OBESITY COMPLICATING PREGNANCY, THIRD TRIMESTER: ICD-10-CM

## 2019-10-04 DIAGNOSIS — Z3A.38 38 WEEKS GESTATION OF PREGNANCY: ICD-10-CM

## 2019-10-04 DIAGNOSIS — O36.8130 DECREASED FETAL MOVEMENTS, THIRD TRIMESTER, NOT APPLICABLE OR UNSPECIFIED: ICD-10-CM

## 2019-10-14 ENCOUNTER — APPOINTMENT (OUTPATIENT)
Dept: MATERNAL FETAL MEDICINE | Facility: CLINIC | Age: 29
End: 2019-10-14

## 2019-10-17 ENCOUNTER — APPOINTMENT (OUTPATIENT)
Dept: FAMILY MEDICINE | Facility: HOSPITAL | Age: 29
End: 2019-10-17

## 2019-10-17 DIAGNOSIS — Z34.90 ENCOUNTER FOR SUPERVISION OF NORMAL PREGNANCY, UNSPECIFIED, UNSPECIFIED TRIMESTER: ICD-10-CM

## 2019-10-17 NOTE — HISTORY OF PRESENT ILLNESS
[Postpartum Follow Up] : postpartum follow up [Complications:___] : complications include: [unfilled] [Primary C/S] : delivered by  section

## 2019-10-18 ENCOUNTER — OUTPATIENT (OUTPATIENT)
Dept: OUTPATIENT SERVICES | Facility: HOSPITAL | Age: 29
LOS: 1 days | End: 2019-10-18
Payer: MEDICAID

## 2019-10-18 ENCOUNTER — APPOINTMENT (OUTPATIENT)
Dept: MATERNAL FETAL MEDICINE | Facility: CLINIC | Age: 29
End: 2019-10-18
Payer: MEDICAID

## 2019-10-18 DIAGNOSIS — T81.49XA INFECTION FOLLOWING A PROCEDURE, OTHER SURGICAL SITE, INITIAL ENCOUNTER: ICD-10-CM

## 2019-10-18 DIAGNOSIS — O09.899 SUPERVISION OF OTHER HIGH RISK PREGNANCIES, UNSPECIFIED TRIMESTER: ICD-10-CM

## 2019-10-18 DIAGNOSIS — L08.9 OTHER INJURY OF UNSPECIFIED BODY REGION, INITIAL ENCOUNTER: ICD-10-CM

## 2019-10-18 DIAGNOSIS — T14.8XXA OTHER INJURY OF UNSPECIFIED BODY REGION, INITIAL ENCOUNTER: ICD-10-CM

## 2019-10-18 DIAGNOSIS — Z98.891 HISTORY OF UTERINE SCAR FROM PREVIOUS SURGERY: Chronic | ICD-10-CM

## 2019-10-18 PROCEDURE — 99213 OFFICE O/P EST LOW 20 MIN: CPT | Mod: GE

## 2019-10-18 PROCEDURE — G0463: CPT

## 2019-10-22 NOTE — END OF VISIT
[FreeTextEntry3] : I personally discussed this patient with the PA at the time of the visit. I agree with the assessment and plan as written, unless noted below.

## 2019-10-22 NOTE — HISTORY OF PRESENT ILLNESS
[Postpartum Follow Up] : postpartum follow up [Complications:___] : complications include: [unfilled] [Last Pap Date: ___] : Last Pap Date: [unfilled] [Delivery Date: ___] : on [unfilled] [Primary C/S] : delivered by  section [Male] : Delivery History: baby boy [Breastfeeding] : currently nursing [Clean/Dry/Intact] : clean, dry and intact [Normal] : the vagina was normal [Not Done] : Examination of breasts not done [Doing Well] : is doing well [No Sign of Infection] : is showing no signs of infection [None] : None [Cervix Sample Taken] : cervical sample not taken for a Pap smear [de-identified] : 29 y.o. s/p Primary C/s d/t NRFHRT, then readmitted for PP endometritis and given IV antibiotics. 1) s/p re-admission for wound infection. Incision C/D/I - resolution of infection 2) PP contraception. Pt will use condoms. Declines other contraceptive methods 3) GDMA2. [ ] 10/24: nutrition class and PP GTT 4) Well woman [x] pap, UTD, 3/2019: negative. Will return 3/2020 for annual  [de-identified] : Rtn 10/24 for nutrition class and PP GTT. TRENT Ashford, PAC

## 2019-10-24 ENCOUNTER — APPOINTMENT (OUTPATIENT)
Dept: MATERNAL FETAL MEDICINE | Facility: CLINIC | Age: 29
End: 2019-10-24

## 2019-10-25 DIAGNOSIS — T81.49XA INFECTION FOLLOWING A PROCEDURE, OTHER SURGICAL SITE, INITIAL ENCOUNTER: ICD-10-CM

## 2021-02-02 ENCOUNTER — RX RENEWAL (OUTPATIENT)
Age: 31
End: 2021-02-02

## 2022-06-29 NOTE — PROGRESS NOTE ADULT - SUBJECTIVE AND OBJECTIVE BOX
Vascular & Interventional Radiology Pre-Procedure Note    Procedure Name: _______    HPI: 28y Female with post-operative abdominal wall fluid collection and endometritis. Drainage/aspiration of fluid collection is requested.     Allergies:   Medications (Abx/Cardiac/Anticoagulation/Blood Products)  cefTRIAXone   IVPB: 100 mL/Hr IV Intermittent (09-12 @ 19:42)  clindamycin IVPB: 100 mL/Hr IV Intermittent (09-13 @ 02:05)  clindamycin IVPB: 100 mL/Hr IV Intermittent (09-13 @ 10:07)  gentamicin   IVPB: 300 mL/Hr IV Intermittent (09-13 @ 03:00)    Data:    T(C): 37.5  HR: 97  BP: 100/68  RR: 18  SpO2: 96%      -WBC 12.3 / HgB 8.5 / Hct 26.3 / Plt 552  -Na 138 / Cl 103 / BUN 8 / Glucose 89  -K 3.9 / CO2 20 / Cr 0.60  -ALT -- / Alk Phos -- / T.Bili --  -INR1.29    Imaging:     Plan:   -28y Female presents for drainage/aspiration of abdominal wall collection. Will likely not leave drain behind- spoke to team and patient about this as low likelihood of recurrence and small collection. Patient agreeable to procedure after discussion of risks and benefits.   -Risks/Benefits/alternatives explained with the patient and/or healthcare proxy and witnessed informed consent obtained. no

## 2022-11-03 NOTE — ANESTHESIA FOLLOW-UP NOTE - NSEVALATION_GEN_ALL_CORE
Report given to [facility] RN No apparent complications or complaints regarding anesthesia care at this time/All questions were answered not applicable

## 2023-10-23 NOTE — ED ADULT NURSE NOTE - FINAL NURSING ELECTRONIC SIGNATURE
12-Sep-2019 23:47 Clindamycin Counseling: I counseled the patient regarding use of clindamycin as an antibiotic for prophylactic and/or therapeutic purposes. Clindamycin is active against numerous classes of bacteria, including skin bacteria. Side effects may include nausea, diarrhea, gastrointestinal upset, rash, hives, yeast infections, and in rare cases, colitis.

## 2025-04-03 NOTE — PATIENT PROFILE OB - CAREGIVER
Patient is calling and states that she is returning a call to Lico LEVIN. No other information was provided. Please call back.   Yes